# Patient Record
Sex: FEMALE | Race: BLACK OR AFRICAN AMERICAN | NOT HISPANIC OR LATINO | Employment: PART TIME | URBAN - METROPOLITAN AREA
[De-identification: names, ages, dates, MRNs, and addresses within clinical notes are randomized per-mention and may not be internally consistent; named-entity substitution may affect disease eponyms.]

---
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
## 2023-05-05 ENCOUNTER — HOSPITAL ENCOUNTER (EMERGENCY)
Facility: HOSPITAL | Age: 18
Discharge: HOME | End: 2023-05-05
Attending: EMERGENCY MEDICINE
Payer: COMMERCIAL

## 2023-05-05 VITALS
TEMPERATURE: 99 F | WEIGHT: 129 LBS | HEIGHT: 65 IN | RESPIRATION RATE: 16 BRPM | HEART RATE: 85 BPM | BODY MASS INDEX: 21.49 KG/M2 | SYSTOLIC BLOOD PRESSURE: 114 MMHG | OXYGEN SATURATION: 97 % | DIASTOLIC BLOOD PRESSURE: 79 MMHG

## 2023-05-05 DIAGNOSIS — S61.012A LACERATION OF LEFT THUMB WITHOUT FOREIGN BODY WITHOUT DAMAGE TO NAIL, INITIAL ENCOUNTER: Primary | ICD-10-CM

## 2023-05-05 DIAGNOSIS — M79.645 THUMB PAIN, LEFT: ICD-10-CM

## 2023-05-05 LAB
B-HCG UR QL: NEGATIVE
CTP QC/QA: YES

## 2023-05-05 PROCEDURE — 81025 URINE PREGNANCY TEST: CPT | Mod: ER | Performed by: EMERGENCY MEDICINE

## 2023-05-05 PROCEDURE — 12001 RPR S/N/AX/GEN/TRNK 2.5CM/<: CPT | Mod: ER

## 2023-05-05 PROCEDURE — 99284 EMERGENCY DEPT VISIT MOD MDM: CPT | Mod: 25,ER

## 2023-05-05 PROCEDURE — 25000003 PHARM REV CODE 250: Mod: ER | Performed by: EMERGENCY MEDICINE

## 2023-05-05 RX ORDER — NAPROXEN 500 MG/1
500 TABLET ORAL 2 TIMES DAILY WITH MEALS
Qty: 20 TABLET | Refills: 0 | Status: SHIPPED | OUTPATIENT
Start: 2023-05-05 | End: 2023-05-15

## 2023-05-05 RX ORDER — SULFAMETHOXAZOLE AND TRIMETHOPRIM 800; 160 MG/1; MG/1
1 TABLET ORAL 2 TIMES DAILY
Qty: 10 TABLET | Refills: 0 | Status: SHIPPED | OUTPATIENT
Start: 2023-05-05 | End: 2023-05-10

## 2023-05-05 RX ORDER — MUPIROCIN 20 MG/G
OINTMENT TOPICAL 2 TIMES DAILY
Qty: 22 G | Refills: 0 | Status: SHIPPED | OUTPATIENT
Start: 2023-05-05 | End: 2023-05-15

## 2023-05-05 RX ORDER — LIDOCAINE HYDROCHLORIDE 10 MG/ML
10 INJECTION INFILTRATION; PERINEURAL
Status: COMPLETED | OUTPATIENT
Start: 2023-05-05 | End: 2023-05-05

## 2023-05-05 RX ADMIN — LIDOCAINE HYDROCHLORIDE 10 ML: 10 INJECTION, SOLUTION INFILTRATION; PERINEURAL at 02:05

## 2023-05-05 RX ADMIN — BACITRACIN ZINC, NEOMYCIN, POLYMYXIN B 1 EACH: 400; 3.5; 5 OINTMENT TOPICAL at 02:05

## 2023-05-05 NOTE — Clinical Note
"Zbigniew Pérez" Caio was seen and treated in our emergency department on 5/5/2023.  She may return to work on 05/06/2023.       If you have any questions or concerns, please don't hesitate to call.       RN    "

## 2023-05-05 NOTE — ED PROVIDER NOTES
Encounter Date: 5/5/2023       History     Chief Complaint   Patient presents with    Laceration     Cut LT Thumb with  around Midnight. Tetanus shot is up to date according to pt      18 y.o. female with asthma presents to ED c/o acute left thumb injury that occurred about two hours prior to arrival when she accidentally cut herself with a . Right hand dominant. Denies weakness or numbness  Last tetanus 2015    The history is provided by the patient.   Review of patient's allergies indicates:  No Known Allergies  Past Medical History:   Diagnosis Date    Asthma      History reviewed. No pertinent surgical history.  Family History   Problem Relation Age of Onset    Diabetes Maternal Grandmother     Diabetes Mother     Diabetes Sister     Cancer Neg Hx      Social History     Tobacco Use    Smoking status: Never    Smokeless tobacco: Never   Substance Use Topics    Alcohol use: Never    Drug use: Never     Review of Systems   Constitutional:  Negative for fever.   Musculoskeletal:  Negative for arthralgias and myalgias.   Skin:  Positive for wound.   Neurological:  Negative for weakness and numbness.   All other systems reviewed and are negative.    Physical Exam     Initial Vitals [05/05/23 0217]   BP Pulse Resp Temp SpO2   114/79 85 16 98.5 °F (36.9 °C) 97 %      MAP       --         Physical Exam    Nursing note and vitals reviewed.  Constitutional: She appears well-developed and well-nourished. She is not diaphoretic. No distress.   HENT:   Head: Normocephalic and atraumatic.   Eyes: Conjunctivae are normal.   Neck: Neck supple.   Normal range of motion.  Cardiovascular:  Normal rate and intact distal pulses.           Pulmonary/Chest: No accessory muscle usage. No tachypnea. No respiratory distress.   Abdominal: She exhibits no distension. There is no abdominal tenderness.   Musculoskeletal:         General: No tenderness. Normal range of motion.      Left hand: Laceration present. No  swelling, deformity, tenderness or bony tenderness. Normal range of motion. Normal strength. Normal sensation. Normal capillary refill. Normal pulse.        Hands:       Cervical back: Normal range of motion and neck supple.     Neurological: She is alert and oriented to person, place, and time. She has normal strength. Gait normal. GCS eye subscore is 4. GCS verbal subscore is 5. GCS motor subscore is 6.   Skin: Skin is warm. Capillary refill takes less than 2 seconds.   Psychiatric: She has a normal mood and affect.       ED Course   Lac Repair    Date/Time: 5/5/2023 2:28 AM  Performed by: Dotty Carter MD  Authorized by: Dotty Carter MD     Consent:     Consent obtained:  Verbal    Consent given by:  Patient    Risks, benefits, and alternatives were discussed: yes      Risks discussed:  Infection and pain    Alternatives discussed:  No treatment  Universal protocol:     Procedure explained and questions answered to patient or proxy's satisfaction: yes      Patient identity confirmed:  Verbally with patient and provided demographic data  Anesthesia:     Anesthesia method:  Local infiltration    Local anesthetic:  Lidocaine 1% w/o epi  Laceration details:     Location:  Finger    Finger location:  L thumb    Length (cm):  1    Depth (mm):  2  Pre-procedure details:     Preparation:  Patient was prepped and draped in usual sterile fashion  Exploration:     Limited defect created (wound extended): no      Hemostasis achieved with:  Direct pressure    Wound exploration: wound explored through full range of motion and entire depth of wound visualized      Wound extent: areolar tissue violated      Wound extent: no fascia violation noted, no foreign bodies/material noted, no muscle damage noted, no tendon damage noted, no underlying fracture noted and no vascular damage noted      Contaminated: no    Treatment:     Area cleansed with:  Chlorhexidine, saline and povidone-iodine    Amount of cleaning:  Standard     Irrigation solution:  Sterile saline    Debridement:  None    Undermining:  None    Scar revision: no    Skin repair:     Repair method:  Sutures    Suture size:  4-0    Suture material:  Nylon    Suture technique:  Simple interrupted and horizontal mattress    Number of sutures:  3 (two simple interrupted and one horizontal mattress)  Approximation:     Approximation:  Close  Repair type:     Repair type:  Simple  Post-procedure details:     Dressing:  Antibiotic ointment, splint for protection and non-adherent dressing    Procedure completion:  Tolerated well, no immediate complications  Comments:      Neurovascular status intact before and after procedure done.  Labs Reviewed   POCT URINE PREGNANCY          Imaging Results    None          Medications   LIDOcaine HCL 10 mg/ml (1%) injection 10 mL (10 mLs Intradermal Given 5/5/23 0251)   neomycin-bacitracnZn-polymyxnB packet 1 each (1 each Topical (Top) Given 5/5/23 0251)     Medical Decision Making:   Initial Assessment:   18 y.o. female with asthma presents to ED c/o acute left thumb injury that occurred about two hours prior to arrival when she accidentally cut herself with a .  No clinical signs or symptoms of tendon injury, vascular injury, bony injury, or nerve injury.  Differential Diagnosis:   Laceration, abrasion, avulsion, retained FB, others                      Labs Reviewed  Admission on 05/05/2023   Component Date Value Ref Range Status    POC Preg Test, Ur 05/05/2023 Negative  Negative Final     Acceptable 05/05/2023 Yes   Final        Imaging Reviewed    Imaging Results    None         Medications given in ED    Medications   LIDOcaine HCL 10 mg/ml (1%) injection 10 mL (10 mLs Intradermal Given 5/5/23 0251)   neomycin-bacitracnZn-polymyxnB packet 1 each (1 each Topical (Top) Given 5/5/23 0251)       Note was created using voice recognition software. Note may have occasional typographical errors that may not have been  identified and edited despite good jaja initial review prior to signing.    Clinical Impression:   Final diagnoses:  [M79.645] Thumb pain, left  [S61.012A] Laceration of left thumb without foreign body without damage to nail, initial encounter (Primary)        ED Disposition Condition    Discharge Stable          ED Prescriptions       Medication Sig Dispense Start Date End Date Auth. Provider    mupirocin (BACTROBAN) 2 % ointment Apply topically 2 (two) times daily. for 10 days 22 g 5/5/2023 5/15/2023 Dotty Carter MD    naproxen (NAPROSYN) 500 MG tablet Take 1 tablet (500 mg total) by mouth 2 (two) times daily with meals. for 10 days 20 tablet 5/5/2023 5/15/2023 Dotty Carter MD    sulfamethoxazole-trimethoprim 800-160mg (BACTRIM DS) 800-160 mg Tab Take 1 tablet by mouth 2 (two) times daily. for 5 days 10 tablet 5/5/2023 5/10/2023 Dotty Carter MD          Follow-up Information       Follow up With Specialties Details Why Contact Info    The nearest emergency department.  Go to  As needed, If symptoms worsen     Landen Mcallister MD Pediatrics Call  As needed, for ongoing care [address] [facility]  [place] [address] [phone]      [facility] ED Emergency Medicine Go in 10 days For suture removal [address] [facility] [address] [phone]             Dotty Carter MD  05/05/23 0256

## 2023-05-05 NOTE — Clinical Note
"Zbigniew Pérez" Caio was seen and treated in our emergency department on 5/5/2023.  She may return with limitations on 05/06/2023.  Please allow for light duty until suture removed in 10 days. (5/15/23). Thank You     Sincerely,      George VALLEJO RN    "

## 2023-05-05 NOTE — Clinical Note
"Zbigniew Pérez" Caio was seen and treated in our emergency department on 5/5/2023.  She may return to work on 05/05/2023.       If you have any questions or concerns, please don't hesitate to call.       RN    "

## 2023-05-05 NOTE — DISCHARGE INSTRUCTIONS
Keep wound clean and dry.  Wash with soap and water only.  Change bandage daily.  Sutures should be removed within 7-10 days.

## 2023-05-15 ENCOUNTER — HOSPITAL ENCOUNTER (EMERGENCY)
Facility: HOSPITAL | Age: 18
Discharge: HOME | End: 2023-05-15
Attending: EMERGENCY MEDICINE
Payer: MEDICAID

## 2023-05-15 VITALS
WEIGHT: 128 LBS | HEIGHT: 64 IN | RESPIRATION RATE: 18 BRPM | DIASTOLIC BLOOD PRESSURE: 64 MMHG | OXYGEN SATURATION: 99 % | TEMPERATURE: 98 F | BODY MASS INDEX: 21.85 KG/M2 | HEART RATE: 78 BPM | SYSTOLIC BLOOD PRESSURE: 112 MMHG

## 2023-05-15 DIAGNOSIS — Z48.02 ENCOUNTER FOR REMOVAL OF SUTURES: Primary | ICD-10-CM

## 2023-05-15 DIAGNOSIS — Z51.89 ENCOUNTER FOR POST-TRAUMATIC WOUND CHECK: ICD-10-CM

## 2023-05-15 PROCEDURE — 99282 EMERGENCY DEPT VISIT SF MDM: CPT | Mod: ER

## 2023-05-15 NOTE — ED PROVIDER NOTES
Encounter Date: 5/15/2023       History     Chief Complaint   Patient presents with    Suture / Staple Removal     Left thumb suture removal.  3 sutures present, wound appears healthy.  No redness, no swelling noted.       18-year-old female presenting for suture removal.  Sutures placed in this facility on 5/5/2023 after accidentally injuring the area while using a .  She denies any drainage or concerns for wound infection.  She has been caring for the wound as instructed.  No other complaints or concerns.    The history is provided by the patient.   Review of patient's allergies indicates:  No Known Allergies  Past Medical History:   Diagnosis Date    Asthma      No past surgical history on file.  Family History   Problem Relation Age of Onset    Diabetes Maternal Grandmother     Diabetes Mother     Diabetes Sister     Cancer Neg Hx      Social History     Tobacco Use    Smoking status: Never    Smokeless tobacco: Never   Substance Use Topics    Alcohol use: Never    Drug use: Never     Review of Systems   Constitutional:  Negative for chills, fatigue and fever.   HENT:  Negative for congestion, ear pain, nosebleeds, postnasal drip, rhinorrhea, sinus pressure and sore throat.    Eyes:  Negative for photophobia and pain.   Respiratory:  Negative for apnea, cough, choking, chest tightness, shortness of breath, wheezing and stridor.    Cardiovascular:  Negative for chest pain, palpitations and leg swelling.   Gastrointestinal:  Negative for abdominal pain, constipation, diarrhea, nausea and vomiting.   Genitourinary:  Negative for dysuria, flank pain, hematuria, pelvic pain and vaginal discharge.   Musculoskeletal:  Negative for arthralgias, back pain, gait problem and neck pain.   Skin:  Positive for wound. Negative for color change, pallor and rash.   Neurological:  Negative for dizziness, seizures, syncope, facial asymmetry, light-headedness and headaches.   Hematological:  Negative for adenopathy.    Psychiatric/Behavioral:  Negative for confusion. The patient is not nervous/anxious.      Physical Exam     Initial Vitals [05/15/23 1526]   BP Pulse Resp Temp SpO2   106/67 89 18 98 °F (36.7 °C) 99 %      MAP       --         Physical Exam    Nursing note and vitals reviewed.  Constitutional: She appears well-developed and well-nourished. She is not diaphoretic. No distress.   HENT:   Head: Normocephalic and atraumatic.   Right Ear: External ear normal.   Left Ear: External ear normal.   Nose: Nose normal.   Eyes: Conjunctivae and EOM are normal. Right eye exhibits no discharge. Left eye exhibits no discharge.   Neck: Neck supple. No tracheal deviation present.   Normal range of motion.  Cardiovascular:  Normal rate.           Pulmonary/Chest: No stridor. No respiratory distress.   Abdominal: Abdomen is soft. She exhibits no distension. There is no abdominal tenderness.   Musculoskeletal:         General: No tenderness. Normal range of motion.      Cervical back: Normal range of motion and neck supple.     Neurological: She is alert and oriented to person, place, and time. She has normal strength. No cranial nerve deficit or sensory deficit.   Skin: Skin is warm and dry. Laceration noted.   1 cm laceration to the dorsal aspect of the left proximal thumb.  There are 3 sutures in place.  There is no erythema, warmth, swelling, drainage, tenderness   Psychiatric: She has a normal mood and affect. Her behavior is normal. Judgment and thought content normal.       ED Course   Suture Removal    Date/Time: 5/15/2023 5:44 PM  Location procedure was performed: [facility] EMERGENCY DEPARTMENT  Performed by: Prem Christopher NP  Authorized by: Blanca Guerrero MD   Body area: upper extremity  Location details: left thumb  Wound Appearance: clean, well healed, normal color, nontender and no drainage  Sutures Removed: 3  Post-removal: dressing applied  Facility: sutures placed in this facility  Complications: No  Specimens:  No  Implants: No      Labs Reviewed - No data to display       Imaging Results    None          Medications - No data to display  Medical Decision Making:   History:   Old Medical Records: I decided to obtain old medical records.  ED Management:  Wound check without evidence of infection or other complication.  Wound is healing well.  Sutures removed as above.  No complication.  Advised patient to continue wound care until laceration is completely healed.  She expressed understanding.                        Clinical Impression:   Final diagnoses:  [Z48.02] Encounter for removal of sutures (Primary)  [Z51.89] Encounter for post-traumatic wound check        ED Disposition Condition    Discharge Stable          ED Prescriptions    None       Follow-up Information       Follow up With Specialties Details Why Contact Info    Cac Clive Mcallister MD Pediatrics Schedule an appointment as soon as possible for a visit in 1 week For further evaluation [address] [facility]  [place] [id number] [phone]      [facility] ED Emergency Medicine Go to  If symptoms worsen, As needed [address] [facility] [address] [phone]             Prem Christopher, MIGUEL  05/15/23 4471

## 2023-05-15 NOTE — DISCHARGE INSTRUCTIONS

## 2025-04-05 ENCOUNTER — HOSPITAL ENCOUNTER (INPATIENT)
Facility: OTHER | Age: 20
LOS: 6 days | Discharge: HOME | End: 2025-04-11
Attending: OBSTETRICS & GYNECOLOGY | Admitting: OBSTETRICS & GYNECOLOGY
Payer: MEDICAID

## 2025-04-05 DIAGNOSIS — Z3A.25 25 WEEKS GESTATION OF PREGNANCY: Primary | ICD-10-CM

## 2025-04-05 DIAGNOSIS — O60.00 PRETERM LABOR: ICD-10-CM

## 2025-04-05 DIAGNOSIS — O30.042 DICHORIONIC DIAMNIOTIC TWIN PREGNANCY IN SECOND TRIMESTER: ICD-10-CM

## 2025-04-05 PROBLEM — O60.02 PRETERM LABOR IN SECOND TRIMESTER: Status: ACTIVE | Noted: 2025-04-05

## 2025-04-05 LAB
ABSOLUTE EOSINOPHIL (OHS): 0.09 K/UL
ABSOLUTE MONOCYTE (OHS): 1.57 K/UL (ref 0.3–1)
ABSOLUTE NEUTROPHIL COUNT (OHS): 11.52 K/UL (ref 1.8–7.7)
BASOPHILS # BLD AUTO: 0.02 K/UL
BASOPHILS NFR BLD AUTO: 0.1 %
ERYTHROCYTE [DISTWIDTH] IN BLOOD BY AUTOMATED COUNT: 12.9 % (ref 11.5–14.5)
HCT VFR BLD AUTO: 27.4 % (ref 37–48.5)
HGB BLD-MCNC: 9.1 GM/DL (ref 12–16)
IMM GRANULOCYTES # BLD AUTO: 0.06 K/UL (ref 0–0.04)
IMM GRANULOCYTES NFR BLD AUTO: 0.4 % (ref 0–0.5)
LYMPHOCYTES # BLD AUTO: 1.72 K/UL (ref 1–4.8)
MCH RBC QN AUTO: 28.7 PG (ref 27–31)
MCHC RBC AUTO-ENTMCNC: 33.2 G/DL (ref 32–36)
MCV RBC AUTO: 86 FL (ref 82–98)
NUCLEATED RBC (/100WBC) (OHS): 0 /100 WBC
PLATELET # BLD AUTO: 299 K/UL (ref 150–450)
PMV BLD AUTO: 9.3 FL (ref 9.2–12.9)
RBC # BLD AUTO: 3.17 M/UL (ref 4–5.4)
RELATIVE EOSINOPHIL (OHS): 0.6 %
RELATIVE LYMPHOCYTE (OHS): 11.5 % (ref 18–48)
RELATIVE MONOCYTE (OHS): 10.5 % (ref 4–15)
RELATIVE NEUTROPHIL (OHS): 76.9 % (ref 38–73)
WBC # BLD AUTO: 14.98 K/UL (ref 3.9–12.7)

## 2025-04-05 PROCEDURE — 99285 EMERGENCY DEPT VISIT HI MDM: CPT | Mod: 25

## 2025-04-05 PROCEDURE — 82040 ASSAY OF SERUM ALBUMIN: CPT

## 2025-04-05 PROCEDURE — 85025 COMPLETE CBC W/AUTO DIFF WBC: CPT

## 2025-04-05 PROCEDURE — 87340 HEPATITIS B SURFACE AG IA: CPT

## 2025-04-05 PROCEDURE — 59025 FETAL NON-STRESS TEST: CPT | Mod: 26,,, | Performed by: OBSTETRICS & GYNECOLOGY

## 2025-04-05 PROCEDURE — 99223 1ST HOSP IP/OBS HIGH 75: CPT | Mod: 25,,, | Performed by: OBSTETRICS & GYNECOLOGY

## 2025-04-05 PROCEDURE — 86850 RBC ANTIBODY SCREEN: CPT | Performed by: OBSTETRICS & GYNECOLOGY

## 2025-04-05 PROCEDURE — 87389 HIV-1 AG W/HIV-1&-2 AB AG IA: CPT

## 2025-04-05 PROCEDURE — 4A0HXCZ MEASUREMENT OF PRODUCTS OF CONCEPTION, CARDIAC RATE, EXTERNAL APPROACH: ICD-10-PCS | Performed by: OBSTETRICS & GYNECOLOGY

## 2025-04-05 PROCEDURE — 87653 STREP B DNA AMP PROBE: CPT

## 2025-04-05 PROCEDURE — 59025 FETAL NON-STRESS TEST: CPT

## 2025-04-05 PROCEDURE — 86593 SYPHILIS TEST NON-TREP QUANT: CPT

## 2025-04-05 PROCEDURE — 11000001 HC ACUTE MED/SURG PRIVATE ROOM

## 2025-04-05 PROCEDURE — 86762 RUBELLA ANTIBODY: CPT

## 2025-04-05 PROCEDURE — 63600175 PHARM REV CODE 636 W HCPCS

## 2025-04-05 RX ORDER — BETAMETHASONE SODIUM PHOSPHATE AND BETAMETHASONE ACETATE 3; 3 MG/ML; MG/ML
12 INJECTION, SUSPENSION INTRA-ARTICULAR; INTRALESIONAL; INTRAMUSCULAR; SOFT TISSUE EVERY 24 HOURS
Status: COMPLETED | OUTPATIENT
Start: 2025-04-05 | End: 2025-04-06

## 2025-04-05 RX ORDER — INDOMETHACIN 25 MG/1
25 CAPSULE ORAL EVERY 6 HOURS
Status: COMPLETED | OUTPATIENT
Start: 2025-04-06 | End: 2025-04-07

## 2025-04-05 RX ORDER — PRENATAL WITH FERROUS FUM AND FOLIC ACID 3080; 920; 120; 400; 22; 1.84; 3; 20; 10; 1; 12; 200; 27; 25; 2 [IU]/1; [IU]/1; MG/1; [IU]/1; MG/1; MG/1; MG/1; MG/1; MG/1; MG/1; UG/1; MG/1; MG/1; MG/1; MG/1
1 TABLET ORAL DAILY
Status: DISCONTINUED | OUTPATIENT
Start: 2025-04-06 | End: 2025-04-09

## 2025-04-05 RX ORDER — ACETAMINOPHEN 325 MG/1
650 TABLET ORAL EVERY 6 HOURS PRN
Status: DISCONTINUED | OUTPATIENT
Start: 2025-04-06 | End: 2025-04-09

## 2025-04-05 RX ORDER — DIPHENHYDRAMINE HYDROCHLORIDE 50 MG/ML
25 INJECTION, SOLUTION INTRAMUSCULAR; INTRAVENOUS EVERY 4 HOURS PRN
Status: DISCONTINUED | OUTPATIENT
Start: 2025-04-06 | End: 2025-04-09

## 2025-04-05 RX ORDER — AMOXICILLIN 250 MG
1 CAPSULE ORAL NIGHTLY PRN
Status: DISCONTINUED | OUTPATIENT
Start: 2025-04-06 | End: 2025-04-09

## 2025-04-05 RX ORDER — INDOMETHACIN 25 MG/1
50 CAPSULE ORAL ONCE
Status: COMPLETED | OUTPATIENT
Start: 2025-04-05 | End: 2025-04-06

## 2025-04-05 RX ORDER — SODIUM CHLORIDE, SODIUM LACTATE, POTASSIUM CHLORIDE, CALCIUM CHLORIDE 600; 310; 30; 20 MG/100ML; MG/100ML; MG/100ML; MG/100ML
INJECTION, SOLUTION INTRAVENOUS CONTINUOUS
Status: DISCONTINUED | OUTPATIENT
Start: 2025-04-06 | End: 2025-04-07 | Stop reason: ALTCHOICE

## 2025-04-05 RX ORDER — SODIUM CHLORIDE 0.9 % (FLUSH) 0.9 %
10 SYRINGE (ML) INJECTION
Status: DISCONTINUED | OUTPATIENT
Start: 2025-04-06 | End: 2025-04-09

## 2025-04-05 RX ORDER — SIMETHICONE 80 MG
1 TABLET,CHEWABLE ORAL EVERY 6 HOURS PRN
Status: DISCONTINUED | OUTPATIENT
Start: 2025-04-06 | End: 2025-04-09

## 2025-04-05 RX ORDER — MAGNESIUM SULFATE HEPTAHYDRATE 40 MG/ML
2 INJECTION, SOLUTION INTRAVENOUS CONTINUOUS
Status: DISCONTINUED | OUTPATIENT
Start: 2025-04-06 | End: 2025-04-07 | Stop reason: ALTCHOICE

## 2025-04-05 RX ORDER — MAGNESIUM SULFATE HEPTAHYDRATE 40 MG/ML
6 INJECTION, SOLUTION INTRAVENOUS ONCE
Status: COMPLETED | OUTPATIENT
Start: 2025-04-06 | End: 2025-04-06

## 2025-04-05 RX ORDER — DIPHENHYDRAMINE HCL 25 MG
25 CAPSULE ORAL EVERY 4 HOURS PRN
Status: DISCONTINUED | OUTPATIENT
Start: 2025-04-06 | End: 2025-04-09

## 2025-04-05 RX ORDER — ONDANSETRON 8 MG/1
8 TABLET, ORALLY DISINTEGRATING ORAL EVERY 8 HOURS PRN
Status: DISCONTINUED | OUTPATIENT
Start: 2025-04-06 | End: 2025-04-09

## 2025-04-05 RX ORDER — CALCIUM GLUCONATE 98 MG/ML
1 INJECTION, SOLUTION INTRAVENOUS
Status: DISCONTINUED | OUTPATIENT
Start: 2025-04-05 | End: 2025-04-09

## 2025-04-05 RX ADMIN — BETAMETHASONE SODIUM PHOSPHATE AND BETAMETHASONE ACETATE 12 MG: 3; 3 INJECTION, SUSPENSION INTRA-ARTICULAR; INTRALESIONAL; INTRAMUSCULAR at 11:04

## 2025-04-06 ENCOUNTER — ANESTHESIA (OUTPATIENT)
Dept: OBSTETRICS AND GYNECOLOGY | Facility: OTHER | Age: 20
End: 2025-04-06
Payer: MEDICAID

## 2025-04-06 ENCOUNTER — ANESTHESIA EVENT (OUTPATIENT)
Dept: OBSTETRICS AND GYNECOLOGY | Facility: OTHER | Age: 20
End: 2025-04-06
Payer: MEDICAID

## 2025-04-06 PROBLEM — O99.820 GBS (GROUP B STREPTOCOCCUS CARRIER), +RV CULTURE, CURRENTLY PREGNANT: Status: RESOLVED | Noted: 2019-08-05 | Resolved: 2025-04-06

## 2025-04-06 PROBLEM — R52 PAIN DURING LABOR: Status: RESOLVED | Noted: 2019-08-23 | Resolved: 2025-04-06

## 2025-04-06 LAB
ALBUMIN SERPL BCP-MCNC: 2.8 G/DL (ref 3.5–5.2)
ALP SERPL-CCNC: 55 UNIT/L (ref 40–150)
ALT SERPL W/O P-5'-P-CCNC: 9 UNIT/L (ref 10–44)
ANION GAP (OHS): 8 MMOL/L (ref 8–16)
AST SERPL-CCNC: 12 UNIT/L (ref 11–45)
BILIRUB SERPL-MCNC: 0.5 MG/DL (ref 0.1–1)
BUN SERPL-MCNC: 5 MG/DL (ref 6–20)
CALCIUM SERPL-MCNC: 8.8 MG/DL (ref 8.7–10.5)
CHLORIDE SERPL-SCNC: 108 MMOL/L (ref 95–110)
CO2 SERPL-SCNC: 19 MMOL/L (ref 23–29)
CREAT SERPL-MCNC: 0.6 MG/DL (ref 0.5–1.4)
GFR SERPLBLD CREATININE-BSD FMLA CKD-EPI: >60 ML/MIN/1.73/M2
GLUCOSE SERPL-MCNC: 93 MG/DL (ref 70–110)
HBV SURFACE AG SERPL QL IA: NORMAL
HIV 1+2 AB+HIV1 P24 AG SERPL QL IA: NEGATIVE
INDIRECT COOMBS: NORMAL
POTASSIUM SERPL-SCNC: 3.3 MMOL/L (ref 3.5–5.1)
PROT SERPL-MCNC: 7.4 GM/DL (ref 6–8.4)
RH BLD: NORMAL
SODIUM SERPL-SCNC: 135 MMOL/L (ref 136–145)
SPECIMEN OUTDATE: NORMAL
T PALLIDUM IGG+IGM SER QL: NEGATIVE

## 2025-04-06 PROCEDURE — 11000001 HC ACUTE MED/SURG PRIVATE ROOM

## 2025-04-06 PROCEDURE — 59025 FETAL NON-STRESS TEST: CPT | Mod: 26,59,, | Performed by: OBSTETRICS & GYNECOLOGY

## 2025-04-06 PROCEDURE — 99285 EMERGENCY DEPT VISIT HI MDM: CPT | Mod: 25,,, | Performed by: OBSTETRICS & GYNECOLOGY

## 2025-04-06 PROCEDURE — 86920 COMPATIBILITY TEST SPIN: CPT

## 2025-04-06 PROCEDURE — 25000003 PHARM REV CODE 250

## 2025-04-06 PROCEDURE — 63600175 PHARM REV CODE 636 W HCPCS

## 2025-04-06 PROCEDURE — 76815 OB US LIMITED FETUS(S): CPT | Mod: 26,,, | Performed by: OBSTETRICS & GYNECOLOGY

## 2025-04-06 RX ORDER — SODIUM CHLORIDE, SODIUM LACTATE, POTASSIUM CHLORIDE, CALCIUM CHLORIDE 600; 310; 30; 20 MG/100ML; MG/100ML; MG/100ML; MG/100ML
INJECTION, SOLUTION INTRAVENOUS CONTINUOUS
Status: DISCONTINUED | OUTPATIENT
Start: 2025-04-06 | End: 2025-04-06

## 2025-04-06 RX ORDER — LANOLIN ALCOHOL/MO/W.PET/CERES
1 CREAM (GRAM) TOPICAL DAILY
Status: DISCONTINUED | OUTPATIENT
Start: 2025-04-06 | End: 2025-04-09

## 2025-04-06 RX ORDER — ONDANSETRON HYDROCHLORIDE 2 MG/ML
4 INJECTION, SOLUTION INTRAVENOUS ONCE
Status: COMPLETED | OUTPATIENT
Start: 2025-04-06 | End: 2025-04-06

## 2025-04-06 RX ORDER — HYDROCODONE BITARTRATE AND ACETAMINOPHEN 500; 5 MG/1; MG/1
TABLET ORAL
Status: DISCONTINUED | OUTPATIENT
Start: 2025-04-06 | End: 2025-04-06

## 2025-04-06 RX ADMIN — MAGNESIUM SULFATE HEPTAHYDRATE 2 G/HR: 40 INJECTION, SOLUTION INTRAVENOUS at 12:04

## 2025-04-06 RX ADMIN — ONDANSETRON 4 MG: 2 INJECTION INTRAMUSCULAR; INTRAVENOUS at 12:04

## 2025-04-06 RX ADMIN — PENICILLIN G POTASSIUM 3 MILLION UNITS: 20000000 INJECTION, POWDER, FOR SOLUTION INTRAMUSCULAR; INTRAVENOUS at 04:04

## 2025-04-06 RX ADMIN — INDOMETHACIN 25 MG: 25 CAPSULE ORAL at 05:04

## 2025-04-06 RX ADMIN — SODIUM CHLORIDE, POTASSIUM CHLORIDE, SODIUM LACTATE AND CALCIUM CHLORIDE 125 ML/HR: 600; 310; 30; 20 INJECTION, SOLUTION INTRAVENOUS at 12:04

## 2025-04-06 RX ADMIN — INDOMETHACIN 25 MG: 25 CAPSULE ORAL at 11:04

## 2025-04-06 RX ADMIN — PENICILLIN G POTASSIUM 3 MILLION UNITS: 20000000 INJECTION, POWDER, FOR SOLUTION INTRAMUSCULAR; INTRAVENOUS at 12:04

## 2025-04-06 RX ADMIN — INDOMETHACIN 50 MG: 25 CAPSULE ORAL at 12:04

## 2025-04-06 RX ADMIN — PENICILLIN G POTASSIUM 3 MILLION UNITS: 20000000 INJECTION, POWDER, FOR SOLUTION INTRAMUSCULAR; INTRAVENOUS at 08:04

## 2025-04-06 RX ADMIN — MAGNESIUM SULFATE HEPTAHYDRATE 2 G/HR: 40 INJECTION, SOLUTION INTRAVENOUS at 05:04

## 2025-04-06 RX ADMIN — BETAMETHASONE SODIUM PHOSPHATE AND BETAMETHASONE ACETATE 12 MG: 3; 3 INJECTION, SUSPENSION INTRA-ARTICULAR; INTRALESIONAL; INTRAMUSCULAR at 11:04

## 2025-04-06 RX ADMIN — PRENATAL VIT W/ FE FUMARATE-FA TAB 27-0.8 MG 1 TABLET: 27-0.8 TAB at 08:04

## 2025-04-06 RX ADMIN — DEXTROSE MONOHYDRATE 5 MILLION UNITS: 5 INJECTION INTRAVENOUS at 12:04

## 2025-04-06 RX ADMIN — SODIUM CHLORIDE, POTASSIUM CHLORIDE, SODIUM LACTATE AND CALCIUM CHLORIDE: 600; 310; 30; 20 INJECTION, SOLUTION INTRAVENOUS at 01:04

## 2025-04-06 RX ADMIN — MAGNESIUM SULFATE HEPTAHYDRATE 6 G: 40 INJECTION, SOLUTION INTRAVENOUS at 12:04

## 2025-04-06 RX ADMIN — FERROUS SULFATE TAB 325 MG (65 MG ELEMENTAL FE) 1 EACH: 325 (65 FE) TAB at 08:04

## 2025-04-06 NOTE — CARE UPDATE
Resident to bedside for complaints of vaginal bleeding. On chux pad, golf ball sized bleeding seen on chux. No active bleeding noted on vaginal exam. Repeat SVE 4/90/BBOW. No bleeding noted on glove. Patient reports contractions have spaced out. FHT reactive and reassuring x2, irregular contractions on tocometry. Continue to monitor. Instructed patient to call out if any additional bleeding or intensified contraction pain.     aMdy King MD PGY-3  Obstetrics and Gynecology  [facility]

## 2025-04-06 NOTE — PROGRESS NOTES
[facility] - Labor & Delivery  Maternal & Fetal Medicine  Progress Note    Patient Name: Zbigniew Kearney  MRN: [id number]  Code Status: Full Code   Admission Date: 4/5/2025  Length of Stay: 1 days  Attending Physician: Erich Cardoso III, *  Primary Care Provider: Landen Mcallister MD    Subjective:     Interval History:   Patient reports that contractions have improved since initial admission. Reports they are tolerable and feel more like cramping. Active fetal movement, minimal vaginal bleeding with wiping, no clots, No loss of fluid    Review of patient's allergies indicates:  No Known Allergies    Objective:     Vital Signs (Most Recent):  Temp: 98.5 °F (36.9 °C) (04/06/25 0023)  Pulse: 87 (04/06/25 0430)  Resp: 15 (04/06/25 0314)  BP: 115/66 (04/06/25 0429)  SpO2: 97 % (04/06/25 0429) Vital Signs (24h Range):  Temp:  [98.5 °F (36.9 °C)-98.6 °F (37 °C)] 98.5 °F (36.9 °C)  Pulse:  [] 87  Resp:  [15-18] 15  SpO2:  [95 %-100 %] 97 %  BP: ()/(55-84) 115/66       Intake/Output Summary (Last 24 hours) at 4/6/2025 0444  Last data filed at 4/6/2025 0329  Gross per 24 hour   Intake 289.29 ml   Output 700 ml   Net -410.71 ml       Physical Exam:   Constitutional: She is oriented to person, place, and time. She appears well-developed and well-nourished.     Eyes: Conjunctivae are normal.     Cardiovascular:  Normal rate.             Pulmonary/Chest: Effort normal. No respiratory distress.        Abdominal: Soft. There is no abdominal tenderness. There is no rebound and no guarding.   gravid             Musculoskeletal: Moves all extremeties.       Neurological: She is alert and oriented to person, place, and time.     Psychiatric: She has a normal mood and affect. Her behavior is normal.     SVE @ 0430: 4/80/-3/BBOW (4/6)    FHT/NST: A: 125, B 135; mod hossein, + accels, few variable decels decels   Cat 2 (reassuring)                 TOCO: Q 2-4 minutes       Significant Labs: CBC:    Recent Labs   Lab  25  2348   WBC 14.98*   HGB 9.1*   HCT 27.4*          Assessment/Plan:   25w6d weeks gestation presents for:    Active Diagnoses:    Diagnosis Date Noted POA    PRINCIPAL PROBLEM:   labor in second trimester [O60.02] 2025 Unknown    Dichorionic diamniotic twin pregnancy in second trimester [O30.042] 2025 Unknown    Asthma - childhood [J45.909] 2019 Yes      Problems Resolved During this Admission:       PTL   -Admitted to L&D with SVE 4/80/-3, @ 25w5d; stable checks x4 (-)  -Magnesium for fetal neuro-protection, BMZ, PCN, Indocin for tocolysis continued  -Continuous monitoring and NPO  -If cervix continues to dilate > deliver via C/S due to prematurity      Di Di twin pregnancy  -Follows with Dr. Fiore at [facility]  -prenatal labs UTD, 3T labs in process   -BSUS with Twin A: vertex, Twin B: transverse head mat R  --Will require C/S for delivery if labor progresses      Anemia  -H/H on admit   -1u pRBC held  -Fe/Colace PPH risk: medium      Childhood Asthma  -asymptomatic  -no home meds    Fellow attestation.Patient is a 19 y.o.  at 25w6d admitted to [facility] service for threatened  labor in the setting of dichorionic diamniotic twin gestation. [place], denies any complications this pregnancy. She reports a history of a full term uncomplicated vaginal delivery.     Yesterday she attended [facility] and reports walking a far distance. While there, she started to notice intermittently painful contractions prompting her presentation to [facility]. Cervix was found to be 4/80/-3 with regular contractions noted on admission. Initiated on betamethasone, magnesium sulfate for neuroprotection, and indocin for tocolysis. Fortunately, Ms. Kearney' cervical exam has remained stable on several exams and she reports an improvement in contractions.       Temp:  [98 °F (36.7 °C)-98.6 °F (37 °C)] 98 °F (36.7 °C)  Pulse:  [] 95  Resp:   [14-18] 14  SpO2:  [95 %-100 %] 96 %  BP: ()/(53-84) 91/53     FHT:   Twin A: Baseline 120s; moderate variability; +accels; occasional small variables; no significant decels  Twin B: Baseline 130s; moderate variability; +accels; occasional small variables; no significant decels  Irregular contractions, some uterine irritability  Overall reactive and reassuring for gestational age     Reviewed the diagnosis and management of threatened  labor at 25 weeks with Di/Di twins. Will plan to continue Magnesium until second dose of betamethasone this evening. Will continue indocin until 48 hours from initial steroid dose (48 hours total). We reviewed that we recommend delivery via CD due to early gestational age and inability to perform a breech extraction of second twin. Will obtain NICU consultation due to anticipated prematurity. All questions answered.      Lizzeth Escalera MD  PGY 7  Maternal Fetal Medicine  [facility]    Michaelle Brown MD  Maternal & Fetal Medicine  [facility] - Labor & Delivery

## 2025-04-06 NOTE — ED PROVIDER NOTES
Encounter Date: 2025       History     Chief Complaint   Patient presents with    Contractions     HPI    Patient is a 18yo  at estimated 25w5d gestation with di/di twins presenting to the [facility] with contractions. Reports feeling intermittent contractions around 2000 this evening, with increase in frequency around 2200. Contractions occurring q 2 minutes. Reports that around 2200, she also had gone to the restroom and noticed blood and mucus, that she thought could be a mucus plug.    Endorsing fetal movement. Denies LOF, CP, SOB, headache.    Uncomplicated IUP thus far.     Review of patient's allergies indicates:  No Known Allergies  Past Medical History:   Diagnosis Date    Asthma      No past surgical history on file.  Family History   Problem Relation Name Age of Onset    Diabetes Maternal Grandmother      Diabetes Mother      Diabetes Sister      Cancer Neg Hx       Social History[1]    Physical Exam     Initial Vitals [25 2304]   BP Pulse Resp Temp SpO2   112/77 103 16 98.6 °F (37 °C) 96 %      MAP       --         Physical Exam    Constitutional: She appears well-developed and well-nourished. She is not diaphoretic. No distress.   HENT:   Head: Normocephalic and atraumatic.   Cardiovascular:  Normal rate, regular rhythm and normal heart sounds.           Pulmonary/Chest: Breath sounds normal. No respiratory distress. She has no wheezes.   Abdominal:   gravid   Musculoskeletal:         General: No edema. Normal range of motion.     Neurological: She is alert.   Skin: Skin is warm and dry.   Psychiatric: She has a normal mood and affect. Thought content normal.     OB LABOR EXAM:   Pre-Term Labor: Yes.   Membranes ruptured: No.   Method: Sterile vaginal exam per MD and Sterile speculum exam per MD.   Vaginal Bleeding: bloody show.     Dilatation: 4.   Station: -3.   Effacement: 80%.       Comments: Bulging bag on spec exam       ED Course   Fetal non-stress test    Date/Time: 2025 11:56  PM    Performed by: Flaquita Lee DO  Authorized by: Jennifer Munoz MD    Nonstress Test:     Variability: A: 6-25 BPM; B 6-25 BPM.    Decelerations:  None    Baby A baseline heart rate (BPM): A: 145; B: 155.    Contractions:  Regular    Contraction Frequency:  Q2 min  Post-procedure:     Patient tolerance:  Patient tolerated the procedure well with no immediate complications    Labs Reviewed   CBC WITH DIFFERENTIAL - Abnormal       Result Value    WBC 14.98 (*)     RBC 3.17 (*)     HGB 9.1 (*)     HCT 27.4 (*)     MCV 86      MCH 28.7      MCHC 33.2      RDW 12.9      Platelet Count 299      MPV 9.3      Nucleated RBC 0      Neut % 76.9 (*)     Lymph % 11.5 (*)     Mono % 10.5      Eos % 0.6      Basophil % 0.1      Imm Grans % 0.4      Neut # 11.52 (*)     Lymph # 1.72      Mono # 1.57 (*)     Eos # 0.09      Baso # 0.02      Imm Grans # 0.06 (*)    GROUP B STREPTOCOCCUS, PCR   CBC W/ AUTO DIFFERENTIAL    Narrative:     The following orders were created for panel order CBC auto differential.  Procedure                               Abnormality         Status                     ---------                               -----------         ------                     CBC with Differential[[id number]]       Abnormal            Final result                 Please view results for these tests on the individual orders.   COMPREHENSIVE METABOLIC PANEL   TREPONEMA PALLIDIUM ANTIBODIES IGG, IGM   HEPATITIS B SURFACE ANTIGEN   RUBELLA ANTIBODY, IGG   HIV 1 / 2 ANTIBODY   TYPE & SCREEN   PREPARE RBC SOFT          Imaging Results    None          Medications   sodium chloride 0.9% flush 10 mL (has no administration in time range)   acetaminophen tablet 650 mg (has no administration in time range)   ondansetron disintegrating tablet 8 mg (has no administration in time range)   senna-docusate 8.6-50 mg per tablet 1 tablet (has no administration in time range)   simethicone chewable tablet 80 mg (has no administration  in time range)   diphenhydrAMINE capsule 25 mg (has no administration in time range)   diphenhydrAMINE injection 25 mg (has no administration in time range)   prenatal vitamin oral tablet (has no administration in time range)   betamethasone acetate-betamethasone sodium phosphate injection 12 mg (12 mg Intramuscular Given 25 2356)   magnesium sulfate in water 40 gram/1,000 mL (4 %) bolus from bag 6 g 150 mL (has no administration in time range)     And   lactated ringers infusion (has no administration in time range)   magnesium sulfate in water 40 gram/1,000 mL (4 %) infusion (has no administration in time range)   calcium gluconate 100 mg/mL (10%) injection 1 g (has no administration in time range)   penicillin G potassium 3 Million Units in dextrose 5 % 100 mL IVPB (has no administration in time range)   indomethacin capsule 25 mg (has no administration in time range)   penicillin G potassium 5 Million Units in D5W 100 mL IVPB (MB+) (has no administration in time range)   0.9%  NaCl infusion (for blood administration) (has no administration in time range)   indomethacin capsule 50 mg (50 mg Oral Given 25 0000)     Medical Decision Making    Patient is a 20yo  at estimated 25w5d gestation with di/di twins presenting to the [facility] with contractions.    FNST showing regular contractions. Speculum exam with bloody show and bulging back. She is 4cm dilated.    POCUS revealing cephalad Baby A and transverse Baby B.     Admitted to L&D for further management. Please see H&P for additional information.              ED Course as of 25 0003   Sat 2025   9061 Zbigniew Kearney is a 19 y.o. female  @ 25w5d presenting for contractions, loss of mucus plug. Pt reports 6/10 pain with contractions, however laughing and conversing with ease. Reports minor VB- believes she lost mucus plug.   VSS  GEN: NAD, cheerful   CV: RRR  Resp: CTAB  Abd: soft, gravid.   Sve per Dr. Brown /-3 @ 7387  USG:  cephalic, baby B transverse, head to maternal right     Plan to admit to L&D for  labor, counseled regarding BMZ, Mag, PCN. Pt agreeable. Discussed likely  delivery if labor progresses and baby B remains with malpresentation or if due to early prematurity either fetus does not tolerate labor.  [RF]      ED Course User Index  [RF] Jennifer Munoz MD                           Clinical Impression:  Final diagnoses:  [O60.00]  labor  [Z3A.25] 25 weeks gestation of pregnancy (Primary)  [O30.042] Dichorionic diamniotic twin pregnancy in second trimester          ED Disposition Condition    Send to L&D                   Flaquita Lee DO  Resident  25 0003         [1]   Social History  Tobacco Use    Smoking status: Never    Smokeless tobacco: Never   Substance Use Topics    Alcohol use: Never    Drug use: Never        Jennifer Munoz MD  25 3045

## 2025-04-06 NOTE — PLAN OF CARE
Problem:  Fall Injury Risk  Goal: Absence of Fall, Infant Drop and Related Injury  Outcome: Progressing     Problem: Adult Inpatient Plan of Care  Goal: Plan of Care Review  Outcome: Progressing  Goal: Patient-Specific Goal (Individualized)  Outcome: Progressing  Goal: Absence of Hospital-Acquired Illness or Injury  Outcome: Progressing  Goal: Optimal Comfort and Wellbeing  Outcome: Progressing  Goal: Readiness for Transition of Care  Outcome: Progressing     Problem: Fatigue  Goal: Improved Activity Tolerance  Outcome: Progressing     Problem: Pain Acute  Goal: Optimal Pain Control and Function  Outcome: Progressing

## 2025-04-06 NOTE — PLAN OF CARE
Vital signs stable.   Pain controlled  Feeding: BF   Void: : spontaneously  No concerns at this time       Detail Level: Detailed Detail Level: Generalized

## 2025-04-06 NOTE — ANESTHESIA PREPROCEDURE EVALUATION
"[facility]  Anesthesia Pre-Operative Evaluation         Patient Name: Zbigniew Kearney  YOB: 2005  MRN: [id number]    2025      Zbigniew Kearney is a 19 y.o. female  @ 25w6d with dichorionic diamniotic twin gestation who presents to [facility] in labor. IUP c/b PTL, di di twin gestation, childhood asthma, anemia (H/H; ).   Patient denies cardiopulmonary disease, bleeding disorders, or spine pathology.     OB History    Para Term  AB Living   2 1 1   1   SAB IAB Ectopic Multiple Live Births      0 1      # Outcome Date GA Lbr Osmin/2nd Weight Sex Type Anes PTL Lv   2 Current            1 Term 19 38w5d  3.56 kg (7 lb 13.6 oz) M Vag-Spont EPI  VASU       Review of patient's allergies indicates:  No Known Allergies    Wt Readings from Last 1 Encounters:   05/15/23 1526 58.1 kg (128 lb)       BP Readings from Last 3 Encounters:   25 112/77   05/15/23 112/64   23 114/79       Problem List[1]    No past surgical history on file.    Social History[2]      Chemistry    No results found for: "NA", "K", "CL", "CO2", "BUN", "CREATININE", "GLU" No results found for: "CALCIUM", "ALKPHOS", "AST", "ALT", "BILITOT", "ESTGFRAFRICA", "EGFRNONAA"         Lab Results   Component Value Date    WBC 14.98 (H) 2025    HGB 9.1 (L) 2025    HCT 27.4 (L) 2025    MCV 86 2025     2025       No results for input(s): "PT", "INR", "PROTIME", "APTT" in the last 72 hours.          Pre-op Assessment    I have reviewed the Patient Summary Reports.     I have reviewed the Nursing Notes. I have reviewed the NPO Status.   I have reviewed the Medications.     Review of Systems  Anesthesia Hx:  No problems with previous Anesthesia   History of prior surgery of interest to airway management or planning:          Denies Family Hx of Anesthesia complications.    Denies Personal Hx of Anesthesia complications.                    Social:  Non-Smoker, " No Alcohol Use       Hematology/Oncology:       -- Anemia:                  Denies Current/Recent Cancer                EENT/Dental:  denies chronic allergic rhinitis           Cardiovascular:      Denies Hypertension.    Denies CAD.           no hyperlipidemia                               Pulmonary:    Denies COPD. Asthma     Denies Sleep Apnea.                Renal/:   Denies Chronic Renal Disease.                Hepatic/GI:      Denies GERD.                Musculoskeletal:  Denies Arthritis.               Neurological:    Denies CVA.    Denies Seizures.                                Psych:  Denies Psychiatric History.                  Physical Exam  General: Well nourished, Cooperative, Alert and Oriented    Airway:  Mallampati: II   Mouth Opening: Normal  TM Distance: Normal  Tongue: Normal  Neck ROM: Normal ROM    Dental:  Intact        Anesthesia Plan  Type of Anesthesia, risks & benefits discussed:    Anesthesia Type: CSE, Gen ETT, Spinal, Epidural  Intra-op Monitoring Plan: Standard ASA Monitors  Post Op Pain Control Plan: multimodal analgesia  Induction:  IV  Airway Plan: Video, Post-Induction  Informed Consent: Informed consent signed with the Patient and all parties understand the risks and agree with anesthesia plan.  All questions answered.   ASA Score: 3  Day of Surgery Review of History & Physical: H&P Update referred to the surgeon/provider.    Ready For Surgery From Anesthesia Perspective.     .           [1]   Patient Active Problem List  Diagnosis    Family history of DVT    Asthma - childhood    GBS (group B Streptococcus carrier), +RV culture, currently pregnant    Pain during labor     labor in second trimester    Dichorionic diamniotic twin pregnancy in second trimester   [2]   Social History  Socioeconomic History    Marital status: Single   Tobacco Use    Smoking status: Never    Smokeless tobacco: Never   Substance and Sexual Activity    Alcohol use: Never    Drug use: Never     Sexual activity: Yes

## 2025-04-06 NOTE — H&P
[facility] Emergency Dept (Obstetrics)  Maternal & Fetal Medicine  History & Physical    Patient Name: Zbigniew Abraham  MRN: [id number]  Admission Date: 2025  Attending Physician: Erich Cardoso III, *   Primary Care Provider: Landen Mcallister MD    Subjective:     Principal Problem: labor in second trimester    HPI:   Zbigniew Abraham is a 19 y.o. [id number] at 25w5d with a dichorionic diamniotic twin gestation who presented to the [facility] complaining of contractions that began about 5 hours ago and have increased in frequency. Reports she was walking around and noticed an increase in her cramping as well as some vaginal spotting with wiping. Denies fever, chills, N/V/D, rash, HA, syncope, abdominal trauma, dysuria, hematuria.    In the [facility] she was found to be 4/80/-3 with a bulging bag of water. Twin A vertex, Twin B transverse with head maternal right. Admitted to L&D for PTL.     This IUP is complicated by Di Di twin gestation, childhood asthma, anemia.  Patient reports contractions, reports vaginal bleeding, denies LOF.   Fetal Movement: normal.       OB History    Para Term  AB Living   2 1 1 0 0 1   SAB IAB Ectopic Multiple Live Births   0 0 0 0 1      # Outcome Date GA Lbr Osmin/2nd Weight Sex Type Anes PTL Lv   2 Current            1 Term 19 38w5d  3.56 kg (7 lb 13.6 oz) M Vag-Spont EPI  VASU      Name: PAULINO ABRAHAM      Apgar1: 9  Apgar5: 9     Past Medical History:   Diagnosis Date    Asthma        No past surgical history on file.    Review of patient's allergies indicates:  No Known Allergies    Prescriptions Prior to Admission[1]  Family History       Problem Relation (Age of Onset)    Diabetes Maternal Grandmother, Mother, Sister          Tobacco Use    Smoking status: Never    Smokeless tobacco: Never   Substance and Sexual Activity    Alcohol use: Never    Drug use: Never    Sexual activity: Yes     Review of Systems   Constitutional:  Negative for chills,  diaphoresis, fatigue and fever.   Respiratory:  Negative for cough and shortness of breath.    Cardiovascular:  Negative for chest pain.   Gastrointestinal:  Positive for abdominal pain. Negative for constipation, diarrhea, nausea and vomiting.   Genitourinary:  Positive for pelvic pain and vaginal bleeding. Negative for dysuria and hematuria.   Musculoskeletal:  Negative for back pain.   Neurological:  Negative for syncope and headaches.     Objective:     Vital Signs (24h Range):      Temp:  [98.6 °F (37 °C)] 98.6 °F (37 °C)  Pulse:  [103] 103  Resp:  [16] 16  SpO2:  [96 %] 96 %  BP: (112)/(77) 112/77       Physical Exam:   Constitutional: She is oriented to person, place, and time. She appears well-developed and well-nourished. No distress.     Eyes: Conjunctivae are normal.     Cardiovascular:  Normal rate.             Pulmonary/Chest: Effort normal. No respiratory distress.        Abdominal: There is no abdominal tenderness. There is no rebound and no guarding.   gravid             Musculoskeletal: No tenderness or edema.       Neurological: She is alert and oriented to person, place, and time.     Psychiatric: She has a normal mood and affect. Her behavior is normal.     SVE: 4/80/-3 with bulging bag of water, minimal blood tinged mucous on labia   SSE: bulging bag of water with blood tinged mucous within vagina    NST  A:140, mod hossein, + accels, variable decels  B: 150, mod hossein, + accels, variable decels  TOCO with irritability and regular ctx q 3 minutes    Overall reassuring and appropriate for gestational age     Significant Labs: CBC:    Recent Labs   Lab 25  2348   WBC 14.98*   HGB 9.1*   HCT 27.4*          Assessment/Plan:   25w5d weeks gestation presents for:    Active Diagnoses:    Diagnosis Date Noted POA    PRINCIPAL PROBLEM:   labor in second trimester [O60.02] 2025 Unknown    Dichorionic diamniotic twin pregnancy in second trimester [O30.042] 2025 Unknown    Asthma -  childhood [J45.909] 2019 Yes      Problems Resolved During this Admission:       PTL   -Admitted to L&D with SVE 4/80/-3, @ 25w5d; will recheck in 2 hours or PRN  -Magnesium for fetal neuro-protection, BMZ, PCN, Indocin for tocolysis initiated  -Continuous monitoring and NPO  -If cervix continues to dilate > deliver via C/S due to prematurity     Di Di twin pregnancy  -Follows with Dr. Fiore at [facility]  -prenatal labs UTD, 3T labs in process   -BSUS with Twin A: vertex, Twin B: transverse head mat R  --Will require C/S for delivery if labor progresses     Anemia  -H/H on admit   -1u pRBC held  -Fe/Colace PPH risk: medium     Childhood Asthma  -asymptomatic  -no home meds    Risks, benefits, alternatives and possible complications have been discussed in detail with the patient.   - Consents signed and added to chart  - Admit to L&D    Michaelle Brown MD  Maternal & Fetal Medicine  [facility] - Emergency Dept (Obstetrics)    Fellow attestation.Patient is a 19 y.o.  at 25w6d admitted to [facility] service for threatened  labor in the setting of dichorionic diamniotic twin gestation. Patient follows at [facility] for prenatal care, denies any complications this pregnancy. She reports a history of a full term uncomplicated vaginal delivery.    [facility] and reports walking a far distance. While there, she started to notice intermittently painful contractions prompting her presentation to [facility]. Cervix was found to be 4/80/-3 with regular contractions noted on admission. Initiated on betamethasone, magnesium sulfate for neuroprotection, and indocin for tocolysis. Fortunately, Ms. Kearney' cervical exam has remained stable on several exams and she reports an improvement in contractions.      Temp:  [98 °F (36.7 °C)-98.6 °F (37 °C)] 98 °F (36.7 °C)  Pulse:  [] 95  Resp:  [14-18] 14  SpO2:  [95 %-100 %] 96 %  BP: ()/(53-84) 91/53    FHT:   Twin A: Baseline 120s; moderate  variability; +accels; occasional small variables; no significant decels  Twin B: Baseline 130s; moderate variability; +accels; occasional small variables; no significant decels  Irregular contractions, some uterine irritability  Overall reactive and reassuring for gestational age    Reviewed the diagnosis and management of threatened  labor at 25 weeks with Di/Di twins. Will plan to continue Magnesium until second dose of betamethasone this evening. Will continue indocin until 48 hours from initial steroid dose (48 hours total). We reviewed that we recommend delivery via CD due to early gestational age and inability to perform a breech extraction of second twin. Will obtain NICU consultation due to anticipated prematurity. All questions answered.     Lizzeth Escalera MD  PGY 7  Maternal Fetal Medicine  [facility]             [1] (Not in a hospital admission)

## 2025-04-07 LAB
RUBV IGG SER-ACNC: 16.6 IU/ML
RUBV IGG SER-IMP: REACTIVE

## 2025-04-07 PROCEDURE — 99232 SBSQ HOSP IP/OBS MODERATE 35: CPT | Mod: 25,TH,, | Performed by: OBSTETRICS & GYNECOLOGY

## 2025-04-07 PROCEDURE — 59025 FETAL NON-STRESS TEST: CPT | Mod: 26,,, | Performed by: OBSTETRICS & GYNECOLOGY

## 2025-04-07 PROCEDURE — 25000003 PHARM REV CODE 250

## 2025-04-07 PROCEDURE — 76811 OB US DETAILED SNGL FETUS: CPT | Mod: 26,,, | Performed by: OBSTETRICS & GYNECOLOGY

## 2025-04-07 PROCEDURE — 63600175 PHARM REV CODE 636 W HCPCS

## 2025-04-07 PROCEDURE — 76812 OB US DETAILED ADDL FETUS: CPT | Mod: 26,,, | Performed by: OBSTETRICS & GYNECOLOGY

## 2025-04-07 PROCEDURE — 99222 1ST HOSP IP/OBS MODERATE 55: CPT | Mod: ,,, | Performed by: PEDIATRICS

## 2025-04-07 PROCEDURE — 11000001 HC ACUTE MED/SURG PRIVATE ROOM

## 2025-04-07 RX ADMIN — INDOMETHACIN 25 MG: 25 CAPSULE ORAL at 05:04

## 2025-04-07 RX ADMIN — INDOMETHACIN 25 MG: 25 CAPSULE ORAL at 11:04

## 2025-04-07 RX ADMIN — PENICILLIN G POTASSIUM 3 MILLION UNITS: 20000000 INJECTION, POWDER, FOR SOLUTION INTRAMUSCULAR; INTRAVENOUS at 04:04

## 2025-04-07 RX ADMIN — PENICILLIN G POTASSIUM 3 MILLION UNITS: 20000000 INJECTION, POWDER, FOR SOLUTION INTRAMUSCULAR; INTRAVENOUS at 12:04

## 2025-04-07 RX ADMIN — PRENATAL VIT W/ FE FUMARATE-FA TAB 27-0.8 MG 1 TABLET: 27-0.8 TAB at 11:04

## 2025-04-07 RX ADMIN — PENICILLIN G POTASSIUM 3 MILLION UNITS: 20000000 INJECTION, POWDER, FOR SOLUTION INTRAMUSCULAR; INTRAVENOUS at 08:04

## 2025-04-07 RX ADMIN — FERROUS SULFATE TAB 325 MG (65 MG ELEMENTAL FE) 1 EACH: 325 (65 FE) TAB at 11:04

## 2025-04-07 RX ADMIN — SODIUM CHLORIDE, POTASSIUM CHLORIDE, SODIUM LACTATE AND CALCIUM CHLORIDE: 600; 310; 30; 20 INJECTION, SOLUTION INTRAVENOUS at 04:04

## 2025-04-07 NOTE — CONSULTS
[facility] Labor & Delivery  Neonatology   Consult and Delivery Plan Note    Patient Name: Zbigniew Kearney  MRN: [id number]  Attending Physician: Erich Cardoso III, *    Maternal History and Summary of Discussion:     I was asked by Dr. Cardona to see this patient and to review expectations and decision-making surrounding delivery of her infant(s). I have spoken directly with Dr. Cardona who relayed the purpose of the consult and a summary of the relevant clinical information. I have reviewed the patient's available records, discussed her case with her obstetric care providers, and obtained further history by interviewing her. Her partner was present in the room during this consultation    Zbigniew Kearney is a 19 y.o.  woman who is 26w0d weeks pregnant with a dichorionic diamniotic twin pregnancy, as dated by LMP. Her pregnancy is complicated by threatened  labor. Her prenatal labs are unremarkable and history includes childhood asthma and anemia.. The key issues surrounding her pregnancy prompting neonatology consultation include threatened  labor at 26 weeks gestation (currently 4 cm dilated).      Summary of Discussion:     Following introductions of everyone present and discussing the goals of our conversation today the following is a summary of our discussion. Zbigniew is expecting a boy and a girl, who are currently unnamed.    Zbigniew and I reviewed expectations for decision-making and outcomes surrounding the birth of an infant with similar characteristics, including  delivery at 26 weeks of a twin gestation. These potential outcomes included  Respiratory distress (needed respiratory support ranging from CPAP to NIPPV to intubation; possible need for surfactant), risk for infection (screen with CBC and blood culture, may need antibiotics), echocardiogram for PDA evaluation and assessing heart structure, importance of a human milk diet for feeding tolerance and  decrease the incidence of NEC, screening for intraventricular hemorrhage and ROP, temp stability (need for an isolette) and electrolyte balance . I emphasized that specific individual outcomes are hard to predict, and that the likelihood of long-term adverse outcomes can be difficult to assess in the  period.     Specific issues surrounding the circumstances of delivery and possible need for resuscitation were reviewed. Potential medical issues in the first few hours to days were reviewed including but not limited to respiratory support, feeding difficulty and need for IV access including umbilical/PICC lines.     A review of what to expect during a typical NICU stay was undertaken. Emphasis was placed on issues surrounding the care of a  who is delivered prematurely at 26 weeks and the range of available therapies was reviewed. Issues surrounding breastfeeding were reviewed. Currently the expectant mother does wish to breastfeed and she is willing to use the breastpump and amenable to donor milk. The availability of lactation counseling and support was reviewed. We discussed criteria for NICU discharge, which may include temp stability in an open crib, being event free from apnea, bradycardia and desaturation and taking all feeds by mouth or having a stable way of receiving enteral feeds (whether it is orally, by NGT or by G-tube). I emphasized that it is difficult to anticipate a specific length of stay prior to the infant's birth. NICU atmosphere, operations and the role of various care providers were discussed. We reviewed the process of giving and receiving information about babies in the NICU. I reviewed the NICU visitation policy    Recommendations/Plan:     My impression is that Zbigniew's response to this consultation was appropriate. The expectant parents had the opportunity to ask questions. They were told that if further questions were to come up they may request a follow-up  consultation.    In Summary:  is expecting to deliver di/di twin infants, possibly at 26 weeks (depending on if labor progresses). Delivery is recommended the OR with L&D Team and NICU Team present.     I have reviewed my recommendations with Dr. Cardona. Thank you for allowing me to participate in the care of this patient. Please do not hesitate to contact the Neonatologist or NICU Palliative Care Nurse if we can be of further assistance.      Anahi Carlton MD FAAP  Neonatology  [facility]  04/07/2025  7:30 PM      Total time spent in consultation (chart review, discussion with medical team, discussion with patient and documentation) 55 minutes.

## 2025-04-07 NOTE — NURSING
1320 patient brought down to [facility] for a follow-up growth ultrasound.  1430 patient brought back to LDR 10. Growth ultrasound done.

## 2025-04-07 NOTE — PLAN OF CARE
Problem:  Fall Injury Risk  Goal: Absence of Fall, Infant Drop and Related Injury  Outcome: Progressing     Problem: Adult Inpatient Plan of Care  Goal: Plan of Care Review  Outcome: Progressing  Goal: Patient-Specific Goal (Individualized)  Outcome: Progressing  Goal: Absence of Hospital-Acquired Illness or Injury  Outcome: Progressing  Goal: Optimal Comfort and Wellbeing  Outcome: Progressing  Goal: Readiness for Transition of Care  Outcome: Progressing     Problem: Fatigue  Goal: Improved Activity Tolerance  Outcome: Progressing     Problem: Pain Acute  Goal: Optimal Pain Control and Function  Outcome: Progressing     Problem:  Labor  Goal: Delayed  Delivery  Outcome: Progressing

## 2025-04-07 NOTE — PROGRESS NOTES
[facility], [place] - Labor & Delivery  Maternal & Fetal Medicine  Progress Note    Patient Name: Zbigniew Kearney  MRN: [id number]  Code Status: Full Code   Admission Date: 4/5/2025  Length of Stay: 2 days  Attending Physician: Erich Cardoso III, *  Primary Care Provider: Landen Mcallister MD    Subjective:     Interval History:   NAEON. Patient denies any contractions overnight. + FM. Reports scant vaginal spotting with wiping. Denies LOF.     Review of patient's allergies indicates:  No Known Allergies    Objective:     Vital Signs (Most Recent):  Temp: 98 °F (36.7 °C) (04/07/25 0530)  Pulse: 97 (04/07/25 0630)  Resp: 16 (04/07/25 0530)  BP: 116/77 (04/07/25 0630)  SpO2: 100 % (04/07/25 0629) Vital Signs (24h Range):  Temp:  [97.8 °F (36.6 °C)-98.6 °F (37 °C)] 98 °F (36.7 °C)  Pulse:  [] 97  Resp:  [14-18] 16  SpO2:  [96 %-100 %] 100 %  BP: ()/(50-78) 116/77       Intake/Output Summary (Last 24 hours) at 4/7/2025 0645  Last data filed at 4/7/2025 0644  Gross per 24 hour   Intake 3865.31 ml   Output 1600 ml   Net 2265.31 ml       Physical Exam:   Constitutional: She is oriented to person, place, and time. She appears well-developed and well-nourished.     Eyes: Conjunctivae are normal.     Cardiovascular:  Normal rate.             Pulmonary/Chest: Effort normal. No respiratory distress.        Abdominal: Soft. There is no abdominal tenderness. There is no rebound and no guarding.   gravid             Musculoskeletal: Moves all extremeties.       Neurological: She is alert and oriented to person, place, and time.     Psychiatric: She has a normal mood and affect. Her behavior is normal.     SVE @ 1000: 4/80/-3/BBOW (4/6)    FHT/NST: A: 130, B 135; mod hossein, + accels, + occ variable decels   Cat 2 (reassuring)                 TOCO: Some uterine irritability noted, no contractions       Significant Labs:  None new    Assessment/Plan:   25w6d weeks gestation presents for:    Active Diagnoses:    Diagnosis Date Noted  POA    PRINCIPAL PROBLEM:   labor in second trimester [O60.02] 2025 Yes    Dichorionic diamniotic twin pregnancy in second trimester [O30.042] 2025 Yes    Asthma - childhood [J45.909] 2019 Yes      Problems Resolved During this Admission:     Zbigniew Kearney is a 19 y.o.  at 26w0d with Di-Di twin gestation who is admitted for management of  labor.     PTL   - Admitted to L&D with SVE /-3; stable checks x4 (-)  - S/p BMZ course (-)  - Patient asymptomatic today; Can de-escalate care today if repeat exam this morning unchanged (DC mag, PCN)  - Continue indocin through steroid window for tocolysis (last dose tonight as BMZ administered at 2330)  - Currently on continuous EFM and NPO; once de-escalated, can transition to CLD and NST BID  - If concern for imminent delivery, plan for pCS due to fetal prematurity  - Neonatology consult pending     Di Di twin pregnancy  - Follows with Dr[facility] Fiore at [facility]  - Prenatal labs UTD, 3T labs in process   - BSUS with Twin A: vertex, Twin B: transverse head mat R  - Will require C/S for delivery if labor progresses      Anemia  -H/H on admit   -1u pRBC held  -Fe/Colace PPH risk: medium      Childhood Asthma  -asymptomatic  -no home meds    Liz Wilburn MD  OB/GYN PGY-3

## 2025-04-08 LAB
ABSOLUTE EOSINOPHIL (OHS): 0 K/UL
ABSOLUTE MONOCYTE (OHS): 2.75 K/UL (ref 0.3–1)
ABSOLUTE NEUTROPHIL COUNT (OHS): 12.88 K/UL (ref 1.8–7.7)
BASOPHILS # BLD AUTO: 0.02 K/UL
BASOPHILS NFR BLD AUTO: 0.1 %
ERYTHROCYTE [DISTWIDTH] IN BLOOD BY AUTOMATED COUNT: 13.3 % (ref 11.5–14.5)
GROUP B STREPTOCOCCUS, PCR (OHS): NEGATIVE
HCT VFR BLD AUTO: 22.6 % (ref 37–48.5)
HGB BLD-MCNC: 7.4 GM/DL (ref 12–16)
IMM GRANULOCYTES # BLD AUTO: 0.12 K/UL (ref 0–0.04)
IMM GRANULOCYTES NFR BLD AUTO: 0.7 % (ref 0–0.5)
LYMPHOCYTES # BLD AUTO: 1.27 K/UL (ref 1–4.8)
MCH RBC QN AUTO: 28.9 PG (ref 27–31)
MCHC RBC AUTO-ENTMCNC: 32.7 G/DL (ref 32–36)
MCV RBC AUTO: 88 FL (ref 82–98)
NUCLEATED RBC (/100WBC) (OHS): 0 /100 WBC
PLATELET # BLD AUTO: 232 K/UL (ref 150–450)
PMV BLD AUTO: 9.5 FL (ref 9.2–12.9)
RBC # BLD AUTO: 2.56 M/UL (ref 4–5.4)
RELATIVE EOSINOPHIL (OHS): 0 %
RELATIVE LYMPHOCYTE (OHS): 7.5 % (ref 18–48)
RELATIVE MONOCYTE (OHS): 16.1 % (ref 4–15)
RELATIVE NEUTROPHIL (OHS): 75.6 % (ref 38–73)
WBC # BLD AUTO: 17.04 K/UL (ref 3.9–12.7)

## 2025-04-08 PROCEDURE — 25000003 PHARM REV CODE 250

## 2025-04-08 PROCEDURE — 37000009 HC ANESTHESIA EA ADD 15 MINS: Performed by: OBSTETRICS & GYNECOLOGY

## 2025-04-08 PROCEDURE — 63600175 PHARM REV CODE 636 W HCPCS

## 2025-04-08 PROCEDURE — 71000033 HC RECOVERY, INTIAL HOUR: Performed by: OBSTETRICS & GYNECOLOGY

## 2025-04-08 PROCEDURE — 36415 COLL VENOUS BLD VENIPUNCTURE: CPT

## 2025-04-08 PROCEDURE — 36004725 HC OB OR TIME LEV III - EA ADD 15 MIN: Performed by: OBSTETRICS & GYNECOLOGY

## 2025-04-08 PROCEDURE — 88307 TISSUE EXAM BY PATHOLOGIST: CPT | Mod: TC

## 2025-04-08 PROCEDURE — 36004724 HC OB OR TIME LEV III - 1ST 15 MIN: Performed by: OBSTETRICS & GYNECOLOGY

## 2025-04-08 PROCEDURE — 88307 TISSUE EXAM BY PATHOLOGIST: CPT | Mod: 26,,, | Performed by: PATHOLOGY

## 2025-04-08 PROCEDURE — 63600175 PHARM REV CODE 636 W HCPCS: Performed by: OBSTETRICS & GYNECOLOGY

## 2025-04-08 PROCEDURE — 37000008 HC ANESTHESIA 1ST 15 MINUTES: Performed by: OBSTETRICS & GYNECOLOGY

## 2025-04-08 PROCEDURE — 11000001 HC ACUTE MED/SURG PRIVATE ROOM

## 2025-04-08 PROCEDURE — 59514 CESAREAN DELIVERY ONLY: CPT | Mod: 22,AT,, | Performed by: OBSTETRICS & GYNECOLOGY

## 2025-04-08 PROCEDURE — 71000039 HC RECOVERY, EACH ADD'L HOUR: Performed by: OBSTETRICS & GYNECOLOGY

## 2025-04-08 PROCEDURE — 85025 COMPLETE CBC W/AUTO DIFF WBC: CPT

## 2025-04-08 PROCEDURE — 63600175 PHARM REV CODE 636 W HCPCS: Mod: JZ,TB

## 2025-04-08 PROCEDURE — 25000003 PHARM REV CODE 250: Performed by: OBSTETRICS & GYNECOLOGY

## 2025-04-08 RX ORDER — OXYTOCIN 10 [USP'U]/ML
INJECTION, SOLUTION INTRAMUSCULAR; INTRAVENOUS
Status: DISCONTINUED | OUTPATIENT
Start: 2025-04-08 | End: 2025-04-08

## 2025-04-08 RX ORDER — LIDOCAINE HYDROCHLORIDE 10 MG/ML
INJECTION, SOLUTION INFILTRATION; PERINEURAL
Status: DISPENSED
Start: 2025-04-08 | End: 2025-04-08

## 2025-04-08 RX ORDER — DEXMEDETOMIDINE HYDROCHLORIDE 100 UG/ML
INJECTION, SOLUTION INTRAVENOUS
Status: DISCONTINUED | OUTPATIENT
Start: 2025-04-08 | End: 2025-04-08

## 2025-04-08 RX ORDER — LIDOCAINE HYDROCHLORIDE 10 MG/ML
30 INJECTION, SOLUTION INFILTRATION; PERINEURAL ONCE
Status: COMPLETED | OUTPATIENT
Start: 2025-04-08 | End: 2025-04-08

## 2025-04-08 RX ORDER — ONDANSETRON HYDROCHLORIDE 2 MG/ML
INJECTION, SOLUTION INTRAMUSCULAR; INTRAVENOUS
Status: DISCONTINUED | OUTPATIENT
Start: 2025-04-08 | End: 2025-04-08

## 2025-04-08 RX ORDER — KETOROLAC TROMETHAMINE 30 MG/ML
30 INJECTION, SOLUTION INTRAMUSCULAR; INTRAVENOUS EVERY 6 HOURS
Status: CANCELLED | OUTPATIENT
Start: 2025-04-08 | End: 2025-04-09

## 2025-04-08 RX ORDER — SODIUM CHLORIDE, SODIUM LACTATE, POTASSIUM CHLORIDE, CALCIUM CHLORIDE 600; 310; 30; 20 MG/100ML; MG/100ML; MG/100ML; MG/100ML
INJECTION, SOLUTION INTRAVENOUS CONTINUOUS PRN
Status: DISCONTINUED | OUTPATIENT
Start: 2025-04-08 | End: 2025-04-08

## 2025-04-08 RX ORDER — DEXAMETHASONE SODIUM PHOSPHATE 4 MG/ML
INJECTION, SOLUTION INTRA-ARTICULAR; INTRALESIONAL; INTRAMUSCULAR; INTRAVENOUS; SOFT TISSUE
Status: DISCONTINUED | OUTPATIENT
Start: 2025-04-08 | End: 2025-04-08

## 2025-04-08 RX ORDER — CEFAZOLIN SODIUM 1 G/3ML
INJECTION, POWDER, FOR SOLUTION INTRAMUSCULAR; INTRAVENOUS
Status: DISCONTINUED | OUTPATIENT
Start: 2025-04-08 | End: 2025-04-08

## 2025-04-08 RX ORDER — MORPHINE SULFATE 0.5 MG/ML
INJECTION, SOLUTION EPIDURAL; INTRATHECAL; INTRAVENOUS
Status: DISCONTINUED | OUTPATIENT
Start: 2025-04-08 | End: 2025-04-08

## 2025-04-08 RX ORDER — MAGNESIUM SULFATE HEPTAHYDRATE 40 MG/ML
INJECTION, SOLUTION INTRAVENOUS
Status: DISPENSED
Start: 2025-04-08 | End: 2025-04-08

## 2025-04-08 RX ORDER — BUPIVACAINE HYDROCHLORIDE 7.5 MG/ML
INJECTION INTRAVENOUS
Status: DISCONTINUED | OUTPATIENT
Start: 2025-04-08 | End: 2025-04-08

## 2025-04-08 RX ORDER — PHENYLEPHRINE HYDROCHLORIDE 10 MG/ML
INJECTION INTRAVENOUS
Status: DISCONTINUED | OUTPATIENT
Start: 2025-04-08 | End: 2025-04-08

## 2025-04-08 RX ORDER — KETOROLAC TROMETHAMINE 30 MG/ML
30 INJECTION, SOLUTION INTRAMUSCULAR; INTRAVENOUS EVERY 6 HOURS
Status: COMPLETED | OUTPATIENT
Start: 2025-04-08 | End: 2025-04-08

## 2025-04-08 RX ORDER — IBUPROFEN 400 MG/1
800 TABLET ORAL EVERY 8 HOURS
Status: CANCELLED | OUTPATIENT
Start: 2025-04-09

## 2025-04-08 RX ORDER — ACETAMINOPHEN 10 MG/ML
INJECTION, SOLUTION INTRAVENOUS
Status: DISCONTINUED | OUTPATIENT
Start: 2025-04-08 | End: 2025-04-08

## 2025-04-08 RX ORDER — LIDOCAINE HYDROCHLORIDE 10 MG/ML
INJECTION, SOLUTION INFILTRATION; PERINEURAL
Status: DISCONTINUED
Start: 2025-04-08 | End: 2025-04-08 | Stop reason: WASHOUT

## 2025-04-08 RX ORDER — FENTANYL CITRATE 50 UG/ML
INJECTION, SOLUTION INTRAMUSCULAR; INTRAVENOUS
Status: DISCONTINUED | OUTPATIENT
Start: 2025-04-08 | End: 2025-04-08

## 2025-04-08 RX ORDER — METHYLERGONOVINE MALEATE 0.2 MG/ML
INJECTION INTRAVENOUS
Status: DISCONTINUED | OUTPATIENT
Start: 2025-04-08 | End: 2025-04-08

## 2025-04-08 RX ORDER — DOCUSATE SODIUM 100 MG/1
200 CAPSULE, LIQUID FILLED ORAL 2 TIMES DAILY
Status: DISCONTINUED | OUTPATIENT
Start: 2025-04-08 | End: 2025-04-11 | Stop reason: HOSPADM

## 2025-04-08 RX ADMIN — SODIUM CHLORIDE, SODIUM LACTATE, POTASSIUM CHLORIDE, AND CALCIUM CHLORIDE: .6; .31; .03; .02 INJECTION, SOLUTION INTRAVENOUS at 03:04

## 2025-04-08 RX ADMIN — ACETAMINOPHEN 650 MG: 325 TABLET, FILM COATED ORAL at 10:04

## 2025-04-08 RX ADMIN — KETOROLAC TROMETHAMINE 30 MG: 30 INJECTION, SOLUTION INTRAMUSCULAR; INTRAVENOUS at 04:04

## 2025-04-08 RX ADMIN — LIDOCAINE HYDROCHLORIDE 30 ML: 10 INJECTION, SOLUTION INFILTRATION; PERINEURAL at 04:04

## 2025-04-08 RX ADMIN — OXYTOCIN 8 UNITS: 10 INJECTION, SOLUTION INTRAMUSCULAR; INTRAVENOUS at 04:04

## 2025-04-08 RX ADMIN — ACETAMINOPHEN 1000 MG: 10 INJECTION INTRAVENOUS at 03:04

## 2025-04-08 RX ADMIN — DIPHENHYDRAMINE HYDROCHLORIDE 25 MG: 25 CAPSULE ORAL at 08:04

## 2025-04-08 RX ADMIN — CEFAZOLIN 2 G: 330 INJECTION, POWDER, FOR SOLUTION INTRAMUSCULAR; INTRAVENOUS at 03:04

## 2025-04-08 RX ADMIN — KETOROLAC TROMETHAMINE 30 MG: 30 INJECTION, SOLUTION INTRAMUSCULAR; INTRAVENOUS at 10:04

## 2025-04-08 RX ADMIN — DIPHENHYDRAMINE HYDROCHLORIDE 25 MG: 25 CAPSULE ORAL at 03:04

## 2025-04-08 RX ADMIN — OXYTOCIN 8 UNITS: 10 INJECTION, SOLUTION INTRAMUSCULAR; INTRAVENOUS at 03:04

## 2025-04-08 RX ADMIN — KETOROLAC TROMETHAMINE 30 MG: 30 INJECTION, SOLUTION INTRAMUSCULAR; INTRAVENOUS at 09:04

## 2025-04-08 RX ADMIN — ACETAMINOPHEN 650 MG: 325 TABLET, FILM COATED ORAL at 09:04

## 2025-04-08 RX ADMIN — ACETAMINOPHEN 650 MG: 325 TABLET, FILM COATED ORAL at 03:04

## 2025-04-08 RX ADMIN — FERROUS SULFATE TAB 325 MG (65 MG ELEMENTAL FE) 1 EACH: 325 (65 FE) TAB at 10:04

## 2025-04-08 RX ADMIN — PHENYLEPHRINE HYDROCHLORIDE 0.5 MCG/KG/MIN: 10 INJECTION INTRAVENOUS at 03:04

## 2025-04-08 RX ADMIN — SODIUM CHLORIDE, SODIUM LACTATE, POTASSIUM CHLORIDE, AND CALCIUM CHLORIDE: .6; .31; .03; .02 INJECTION, SOLUTION INTRAVENOUS at 04:04

## 2025-04-08 RX ADMIN — BUPIVACAINE HYDROCHLORIDE IN DEXTROSE 1.6 ML: 7.5 INJECTION, SOLUTION SUBARACHNOID at 03:04

## 2025-04-08 RX ADMIN — ONDANSETRON 4 MG: 2 INJECTION INTRAMUSCULAR; INTRAVENOUS at 03:04

## 2025-04-08 RX ADMIN — PHENYLEPHRINE HYDROCHLORIDE 200 MCG: 10 INJECTION INTRAVENOUS at 03:04

## 2025-04-08 RX ADMIN — KETOROLAC TROMETHAMINE 30 MG: 30 INJECTION, SOLUTION INTRAMUSCULAR; INTRAVENOUS at 03:04

## 2025-04-08 RX ADMIN — DEXMEDETOMIDINE 8 MCG: 200 INJECTION, SOLUTION INTRAVENOUS at 04:04

## 2025-04-08 RX ADMIN — DEXAMETHASONE SODIUM PHOSPHATE 4 MG: 4 INJECTION INTRA-ARTICULAR; INTRALESIONAL; INTRAMUSCULAR; INTRAVENOUS; SOFT TISSUE at 03:04

## 2025-04-08 RX ADMIN — PRENATAL VIT W/ FE FUMARATE-FA TAB 27-0.8 MG 1 TABLET: 27-0.8 TAB at 10:04

## 2025-04-08 RX ADMIN — MORPHINE SULFATE 0.1 MG: 0.5 INJECTION, SOLUTION EPIDURAL; INTRATHECAL; INTRAVENOUS at 03:04

## 2025-04-08 RX ADMIN — FENTANYL CITRATE 10 MCG: 50 INJECTION INTRAMUSCULAR; INTRAVENOUS at 03:04

## 2025-04-08 RX ADMIN — METHYLERGONOVINE MALEATE 200 MCG: 0.2 INJECTION, SOLUTION INTRAMUSCULAR; INTRAVENOUS at 03:04

## 2025-04-08 RX ADMIN — DOCUSATE SODIUM 200 MG: 100 CAPSULE, LIQUID FILLED ORAL at 08:04

## 2025-04-08 NOTE — PLAN OF CARE
Follow pumping for NICU mother. Continue to pump 8 or more in 24. Wash pump parts and air dry each use, sterilize once every 24 hours. Pump 30 minuets for twins.

## 2025-04-08 NOTE — PLAN OF CARE
Problem:  Fall Injury Risk  Goal: Absence of Fall, Infant Drop and Related Injury  2025 by Ayanna Wharton RN  Outcome: Progressing  2025 by Ayanna Wharton RN  Outcome: Progressing     Problem: Adult Inpatient Plan of Care  Goal: Plan of Care Review  2025 by Ayanna Wharton RN  Outcome: Progressing  2025 by Ayanna Wharton RN  Outcome: Progressing  Goal: Patient-Specific Goal (Individualized)  2025 by Ayanna Wharton RN  Outcome: Progressing  2025 by Ayanna Wharton RN  Outcome: Progressing  Goal: Absence of Hospital-Acquired Illness or Injury  2025 by Ayanna Wharton RN  Outcome: Progressing  2025 by Ayanna Wharton RN  Outcome: Progressing  Goal: Optimal Comfort and Wellbeing  2025 by Ayanna Wharton RN  Outcome: Progressing  2025 by Ayanna Wharton RN  Outcome: Progressing  Goal: Readiness for Transition of Care  2025 by Ayanna Wharton RN  Outcome: Progressing  2025 by Ayanna Wharton RN  Outcome: Progressing     Problem: Fatigue  Goal: Improved Activity Tolerance  2025 by Ayanna Wharton RN  Outcome: Progressing  2025 by Ayanna Wharton RN  Outcome: Progressing     Problem: Pain Acute  Goal: Optimal Pain Control and Function  2025 by Ayanna Wharton RN  Outcome: Progressing  2025 by Ayanna Wahrton RN  Outcome: Progressing     Problem: Postpartum ( Delivery)  Goal: Successful Parent Role Transition  2025 by Ayanna Wharton RN  Outcome: Progressing  2025 by Ayanna Wharton RN  Outcome: Progressing  Goal: Hemostasis  2025 by Ayanna Wharton RN  Outcome: Progressing  2025 by Ayanna Wharton RN  Outcome: Progressing  Goal: Effective Bowel Elimination  2025 by Ayanna Wharton RN  Outcome: Progressing  2025 by Ayanna Wharton, RN  Outcome: Progressing  Goal: Fluid and Electrolyte Balance  2025 0511 by Ayanna Wharton,  RN  Outcome: Progressing  4/8/2025 0510 by Ayanna Wharton RN  Outcome: Progressing  Goal: Absence of Infection Signs and Symptoms  4/8/2025 0511 by Ayanna Wharton RN  Outcome: Progressing  4/8/2025 0510 by Ayanna Wharton RN  Outcome: Progressing  Goal: Anesthesia/Sedation Recovery  4/8/2025 0511 by Ayanna Wharton RN  Outcome: Progressing  4/8/2025 0510 by Ayanna Wharton RN  Outcome: Progressing  Goal: Optimal Pain Control and Function  4/8/2025 0511 by Ayanna Wharton RN  Outcome: Progressing  4/8/2025 0510 by Ayanna Wharton RN  Outcome: Progressing  Goal: Nausea and Vomiting Relief  4/8/2025 0511 by Ayanna Wharton RN  Outcome: Progressing  4/8/2025 0510 by Ayanna Wharton RN  Outcome: Progressing  Goal: Effective Urinary Elimination  4/8/2025 0511 by Ayanna Wharton RN  Outcome: Progressing  4/8/2025 0510 by Ayanna Wharton RN  Outcome: Progressing  Goal: Effective Oxygenation and Ventilation  4/8/2025 0511 by Ayanna Wharton RN  Outcome: Progressing  4/8/2025 0510 by Ayanna Wharton RN  Outcome: Progressing

## 2025-04-08 NOTE — CARE UPDATE
Resident to bedside in the setting of STAFF ASSIST:       Upon entering room, pt noted to have undergone spontaneous rupture of membranes, with meconium noted at bedside. Sterile vaginal exam performed at this time By Dr. Lalita Yao, 10/100/0 and  Twin A in vertex orientation. Decision made to head to the OR for classical  section in the setting of SROM at 26wga. Op note to follow.       Jasmina Camara MD (Mary)   Obstetrics and Gynecology, PGY1

## 2025-04-08 NOTE — DISCHARGE INSTRUCTIONS
Breast Feeding Support Groups:   La Leche League: for breast feeding support meetings and help for mothers by phone and in person http://www.[facility] Doctors' Hospitalsla.org    ALEX BabyCafé:  [email or web address] or      504517-MILK   Café au Lait:/ Café con Leche: amelie de lactancia  maternaeh Español  www.[employer].com/groups/CafeAuLait[place]/   ([phone], [email or web address]      Outpatient Lactation Help: ([facility])   ALEX BabyCafe    [phone]   [email or web address]     ALEX Milk Mom    Luz Horn [phone]   [email or web address]   [employer]    [phone]   [email or web address] [employer]   ALEX Nesting    [phone]; [address] [facility]    [email or web address]/   [facility]:    [address] [place] [address], [address], [facility] [phone]   www.[facility] Centerreastfeedingcenter.org      Outpatient Lactation Help: ([facility])   [facility] Lactation Care    SAMIRA Harrison  (Also serves [facility])  [email or web address]   Milk Drops DEV English    www.milkdrops.org   Email: [email or web address]   [place] Lactation    Duyen [phone]   [email or web address]      [facility] Breastfeeding Support:    Pacify Virgilio - free breastfeeding support 24/7      Internet Resources:   www.[employer]   physicianguidetobreastfeeding.org   lacted.org   www.ameda.com   www.coffective.com   www.louisianabreastfeeding.org   www.maymom.com (for pumping supplies)       Additional Resources:    [facility] Helpline:    [phone] Monday-Friday 8am-5pm CST (for questions regarding medication safety and breast feeding)    Virgilio: Geri   LactMed (medications and breast feeding) Free Virgilio   Department of Healths National Breast-Feeding Helpline:  1-219.924.9528      Examples of Phone Apps to help track Babys Feeds or Pumping:       Bri  Baby Tracker     Pumping Work    [facility]  Feed [facility] Breast Feeding  Centers/Lactation Consultants:   [facility] [phone]   [facility] [phone]   [facility]   [phone]   [facility] [phone]   [facility]    [phone]   [facility] ([place]) [phone]    [facility] ([place])                [phone]   [facility]   [phone]   [facility] ([place])   [phone]

## 2025-04-08 NOTE — ANESTHESIA PROCEDURE NOTES
Spinal    Diagnosis: IUP- efren twins  Patient location during procedure: OR  Start time: 4/8/2025 3:34 AM  Timeout: 4/8/2025 3:34 AM  End time: 4/8/2025 3:34 AM    Staffing  Authorizing Provider: Sameer Peter MD  Performing Provider: Sameer Peter MD    Staffing  Performed by: Sameer Peter MD  Authorized by: Sameer Peter MD    Preanesthetic Checklist  Completed: patient identified, IV checked, site marked, risks and benefits discussed, surgical consent, monitors and equipment checked, pre-op evaluation and timeout performed  Spinal Block  Patient position: right lateral decubitus  Prep: ChloraPrep  Patient monitoring: heart rate, continuous pulse ox and frequent blood pressure checks  Approach: midline  Location: L3-4  Injection technique: single shot  CSF Fluid: clear free-flowing CSF  Needle  Needle type: Rica   Needle gauge: 25 G  Needle length: 3.5 in  Needle localization: anatomical landmarks  Assessment  Sensory level: T4   Dermatomal levels determined by pinch or prick  Ease of block: easy  Patient's tolerance of the procedure: comfortable throughout block             Normal for race

## 2025-04-08 NOTE — LACTATION NOTE
This note was copied from a baby's chart.  Lactation Note: Met mother, father and family at bedside; Introduced self. Benefits of mother's own milk and donor human milk discussed with mother. Mother encouraged to provide human milk for her baby to promote growth and development and to reduce the risk of illness and disease. Discussed the importance of frequent pumping in first two weeks to establish a full breast milk supply. Encouraged pumping 8 or more times in 24 hours and skin to skin care when baby able. Discussed pumping every 2-3 hours with only one 5-hour break without pumping for sleep. Recommended pumping schedule discussed. NICU guide given to mother. Pumping supplies at bedside. Benefits of breast milk for baby and benefits of providing breast milk for mother discussed. Benefits of Skin to skin discussed and encouraged skin to skin. Discussed use of NICU stepan pump. Required paperwork given to parents to complete. Encouragement and support offered to mom and dad. Kellen Green, BSN, RNC, CLC, IBCLC

## 2025-04-08 NOTE — TRANSFER OF CARE
"Anesthesia Transfer of Care Note    Patient: Zbigniew Kearney    Procedure(s) Performed: * No procedures listed *    Patient location: Labor and Delivery    Anesthesia Type: spinal    Transport from OR: Transported from OR on room air with adequate spontaneous ventilation    Post pain: adequate analgesia    Post assessment: no apparent anesthetic complications and tolerated procedure well    Post vital signs: stable    Level of consciousness: awake, alert and oriented    Nausea/Vomiting: no nausea/vomiting    Complications: none    Transfer of care protocol was followed      Last vitals: Visit Vitals  BP (!) 105/58 (BP Location: Left arm, Patient Position: Lying)   Pulse 88   Temp 36.7 °C (98.1 °F) (Oral)   Resp 16   Ht 5' 4" (1.626 m)   SpO2 95%   Breastfeeding No   BMI 21.97 kg/m²     "

## 2025-04-08 NOTE — RESEARCH
Spoke to mother and family regarding [id number] (Footprints) study. Will follow up at noon regarding consenting decision.

## 2025-04-08 NOTE — PLAN OF CARE
VSS. Pain controlled with oral Tylenol and Toradol. Pumping Q3.  Fundus firm and midline with moderate lochia rubra. LTV dressing in place, clean/dry/intact.  Lowery discontinued @1550. Due to void @ 2130. Passing gas. No concerns at this time.

## 2025-04-08 NOTE — BRIEF OP NOTE
BRIEF OPERATIVE NOTE       SUMMARY      Surgery Date: 2025     SURGEON: Krystyna Somers    ASSISTANT: 1) Lalita Yao MD  2) Fide Kiser MD    PREOP DIAGNOSIS: 1) IUP @ 26w1d  2) Di/di twin pregnancy  3) PTL  4) PPROM twin A  5) anemia    POSTOP DIAGNOSIS:  same    PROCEDURES: primary classical     ANESTHESIA: spinal    FINDINGS/KEY COMPONENTS: viable vtx female, viable vtx male, normal uterus,  tubes, and ovaries    ESTIMATED BLOOD LOSS: 1245cc    COMPLICATIONS: none    PATHOLOGY:   Specimens (From admission, onward)      Placentas

## 2025-04-08 NOTE — L&D DELIVERY NOTE
[facility], [place], operated by [facility] - Labor & Delivery   Section   Operative Note    SUMMARY     Surgery Date: 2025     Procedure:   1. Classical  Section via pfannenstiel skin incision    Indications:   1.  26 week twin gestation, malpresentation of twin B    Pre-operative Diagnosis:   1. IUP at 26 week 0 day pregnancy  2. PTL  3. Anemia  4. Asthma     Post-operative Diagnosis:   1. Same  2. S/p classical  section  3. PPH    Surgeons and Role:     * Krystyna Somers MD - Primary     * Lalita Yao MD - Resident - Assisting     * Fide Kiser MD - Resident - Assisting     * Renae Camara MD - Resident - Assisting    Anesthesia: Spinal/Epidural    Findings:    1. Viable  female/male infants, with Apgar scores 7/3, and 1/1, weight 800g and 940g respectively.  2. Normal appearing uterus, ovaries, and fallopian tubes.  3. Normal placenta  4. Classical incision closed in 2 layers.  5. PPH, mostly from incision  6. Excellent hemostasis following closure of each tissue layer     Estimated Blood Loss:  1245 mL           Total IV Fluids: Per anesthesia records      UOP: Per anesthesia records                   Complications:   PPH, otherwise patient tolerated well           Disposition: PACU - hemodynamically stable.           Condition: stable    Procedure Details   Patient was in labor and delivery room where she called her nurse in emergently for feeling increased pressure. Staff assist was called. At bedside patient in bathroom where ERIC was identified. She was sat on the bed where her membranes spontaneously ruptured. She was examined and was complete zero station. She was urgently moved to the OR for delivery via c/section. The patient concurred with the proposed plan, giving informed consent. The patient was taken to Operating Room, identified as Zbigniew Kearney and the procedure verified as  Delivery. A Time Out was held and the above information confirmed.    After induction of  anesthesia, the patient was prepped and draped in the usual sterile manner while placed in a dorsal supine position with a left lateral tilt. A gleason catheter was also placed per nursing. SCDs were on and cycling. Preoperative antibiotics Ancef 2 g and azithromycin 500 mg were administered. An allis test was performed confirming adequate anesthesia. A Pfannenstiel incision was made and carried down through the subcutaneous tissue to the fascia. Fascia was extended transversely. The peritoneum was identified, found to be free of adherent bowel, and entered bluntly. Peritoneal incision was extended longitudinally. The vesico-uterine peritoneum was identified, and bladder blade was inserted. The lower uterine segment was not well developed so decision as made to proceed with classical c/section. A classical incision was made and knife and extended with bandage scissors. The amniotic sac was already ruptured and the infant was noted to be in vertex presentation. The fetal head was brought to the incision and elevated out of the pelvis. The patient delivered a single viable female infant without difficulty.  Infant weighed 800 grams with Apgar scores of 7/3 at one and five minutes respectively. After the umbilical cord was clamped and cut, cord blood was obtained for evaluation. Attention was then turned to Twin B membranes were ruptured with allis. Infant was found to be transverse. Fetal head was brought to the hysterotomy. The patient delivered a single viable male without difficulty. Infant weighted 940 g with Apgars of 1/1 at one and five minutes respectively. After the umbilical cord was clamped and cut, cord blood was obtained for evaluation. The placentas were removed intact, appeared normal, and were sent to pathology. The uterus was exteriorized. The uterine incision was closed in two layers with running locked sutures of 1 Chromic. Hemostasis was observed. The uterine outline, tubes and ovaries appeared  normal. The uterus was returned to the abdominal cavity. Incision was reinspected and good hemostasis was noted. The abdominal cavity was inspected and clots removed. The subfascial space was inspected and excellent hemostasis achieved. The fascia was then reapproximated with running sutures of 1 PDS. During fascial closure patient started experiencing pain. Incision was injected with 15 cc of 1% lidocaine. Skin was reapproximated with 4-0 monocryl.    Instrument, sponge, and needle counts were correct prior the abdominal closure and at the conclusion of the case.     Pt tolerated procedure well and was in stable condition after the procedure.    **NOTE: This patient IS NOT a candidate for trial of labor after  delivery**    Fide Zavaleta MD  Obstetrics and Gynecology, PGY-2          Caio, A Girl Zbigniew [[id number]]   Delivery Information for A Nida Kearney    Birth information:  YOB: 2027   Time of birth: 3:30 AM   Sex: female   Head Delivery Date/Time: 2025  3:30 AM   Delivery type: , Classical   Gestational Age: 26w1d       Delivery Providers    Delivering clinician:            Measurements    Weight:   Length:          Apgars    Living status: Living  Apgar Component Scores:  1 min.:  5 min.:  10 min.:  15 min.:  20 min.:    Skin color:  0  0  1      Heart rate:  2  1  2      Reflex irritability:  2  1  2      Muscle tone:  1  1  0      Respiratory effort:  2  0  2      Total:  7  3  7      Apgars assigned by: NICU         Operative Delivery    Forceps attempted?: No  Vacuum extractor attempted?: No                     Interventions/Resuscitation           Cord    Vessels: 3 vessels  Complications: None  Delayed Cord Clamping?: No  Cord Clamped Date/Time: 2025  3:30 AM  Cord Blood Disposition: Sent with Baby       Placenta    Placenta delivery date/time: 2025 03:35  Placenta removal: Manual removal  Placenta appearance: Intact  Placenta disposition:  Pathology           Labor Events:       labor:       Labor Onset Date/Time:         Dilation Complete Date/Time:         Start Pushing Date/Time:         Start Pushing Date/Time:       Rupture Date/Time:            Rupture type:          Fluid Amount:       Fluid Color:                steroids:       Antibiotics given for GBS:       Induction:       Indications for induction:        Augmentation:       Indications for augmentation:       Labor complications:       Additional complications:          Cervical ripening:                     Delivery:      Episiotomy:       Indication for Episiotomy:       Perineal Lacerations:   Repaired:      Periurethral Laceration:   Repaired:     Labial Laceration:   Repaired:     Sulcus Laceration:   Repaired:     Vaginal Laceration:   Repaired:     Cervical Laceration:   Repaired:     Repair suture:       Repair # of packets:       Last Value - EBL - Nursing (mL):       Sum - EBL - Nursing (mL): 0     Last Value - EBL - Anesthesia (mL):      Calculated QBL (mL): 1245     Running total QBL (mL): 1245     Vaginal Sweep Performed:       Surgicount Correct:       Vaginal Packing:   Quantity:       Other providers:            Details (if applicable):  Trial of Labor No    Categorization: Primary    Priority: Emergency   Indications for : Multiple Gestation;Malposition   Incision Type: classical     Additional  information:  Forceps:    Vacuum:    Breech:    Observed anomalies    Other (Comments):            JAK Kearney [[id number]]   Delivery Information for JAK Kearney    Birth information:  YOB: 2027   Time of birth: 3:32 AM   Sex: male   Head Delivery Date/Time: 2025  3:32 AM   Delivery type: , Classical   Gestational Age: 26w1d       Delivery Providers    Delivering clinician:            Measurements    Weight:   Length:          Apgars    Living status: Living  Apgar Component  Scores:  1 min.:  5 min.:  10 min.:  15 min.:  20 min.:    Skin color:  0  0  1      Heart rate:  1  1  2      Reflex irritability:  0  0  1      Muscle tone:  0  0  1      Respiratory effort:  0  0  2      Total:  1  1  7      Apgars assigned by: NICU         Operative Delivery    Forceps attempted?: No  Vacuum extractor attempted?: No                     Interventions/Resuscitation    Method: NICU Attended       Cord    Vessels: 3 vessels  Complications: None  Delayed Cord Clamping?: No  Cord Clamped Date/Time: 2025  3:32 AM  Cord Blood Disposition: Sent with Baby       Placenta    Placenta delivery date/time: 2025 03:35  Placenta removal: Manual removal  Placenta appearance: Intact  Placenta disposition: Pathology           Labor Events:       labor:       Labor Onset Date/Time:         Dilation Complete Date/Time:         Start Pushing Date/Time:         Start Pushing Date/Time:       Rupture Date/Time:            Rupture type:          Fluid Amount:       Fluid Color:                steroids:       Antibiotics given for GBS:       Induction:       Indications for induction:        Augmentation:       Indications for augmentation:       Labor complications:       Additional complications:          Cervical ripening:                     Delivery:      Episiotomy:       Indication for Episiotomy:       Perineal Lacerations:   Repaired:      Periurethral Laceration:   Repaired:     Labial Laceration:   Repaired:     Sulcus Laceration:   Repaired:     Vaginal Laceration:   Repaired:     Cervical Laceration:   Repaired:     Repair suture:       Repair # of packets:       Last Value - EBL - Nursing (mL):       Sum - EBL - Nursing (mL): 0     Last Value - EBL - Anesthesia (mL):      Calculated QBL (mL): 1245     Running total QBL (mL): 1245     Vaginal Sweep Performed:       Surgicount Correct:       Vaginal Packing:   Quantity:       Other providers:            Details (if  applicable):  Trial of Labor      Categorization: Primary    Priority: Emergency   Indications for : Multiple Gestation;Malposition   Incision Type: classical     Additional  information:  Forceps:    Vacuum:    Breech:    Observed anomalies    Other (Comments):

## 2025-04-08 NOTE — LACTATION NOTE
LilLuxehony pump, tubing, collections containers and labels brought to bedside.  Discussed proper pump setting of initiation phase.  Instructed on proper usage of pump and to adjust suction according to maximum comfort level.  Verified appropriate flange fit.  Educated on the frequency and duration of pumping in order to promote and maintain a full milk supply.  Hands on pumping technique reviewed.  Encouraged hand expression after pumping.  Instructed on cleaning of breast pump parts.  Written instructions also given.  Pt verbalized understanding and appropriate recall for proper milk handling, collection, labeling, storage and transportation.

## 2025-04-09 PROBLEM — O60.02 PRETERM LABOR IN SECOND TRIMESTER: Status: RESOLVED | Noted: 2025-04-05 | Resolved: 2025-04-09

## 2025-04-09 PROBLEM — O30.042 DICHORIONIC DIAMNIOTIC TWIN PREGNANCY IN SECOND TRIMESTER: Status: RESOLVED | Noted: 2025-04-05 | Resolved: 2025-04-09

## 2025-04-09 PROBLEM — D64.9 ACUTE ON CHRONIC ANEMIA: Status: ACTIVE | Noted: 2025-04-09

## 2025-04-09 LAB
ABO + RH BLD: NORMAL
ABSOLUTE EOSINOPHIL (OHS): 0.01 K/UL
ABSOLUTE MONOCYTE (OHS): 2.39 K/UL (ref 0.3–1)
ABSOLUTE NEUTROPHIL COUNT (OHS): 12.29 K/UL (ref 1.8–7.7)
BASOPHILS # BLD AUTO: 0.02 K/UL
BASOPHILS NFR BLD AUTO: 0.1 %
BLD PROD TYP BPU: NORMAL
BLOOD UNIT EXPIRATION DATE: NORMAL
BLOOD UNIT TYPE CODE: 5100
CROSSMATCH INTERPRETATION: NORMAL
DISPENSE STATUS: NORMAL
ERYTHROCYTE [DISTWIDTH] IN BLOOD BY AUTOMATED COUNT: 13.5 % (ref 11.5–14.5)
HCT VFR BLD AUTO: 20.9 % (ref 37–48.5)
HGB BLD-MCNC: 6.5 GM/DL (ref 12–16)
IMM GRANULOCYTES # BLD AUTO: 0.19 K/UL (ref 0–0.04)
IMM GRANULOCYTES NFR BLD AUTO: 1.1 % (ref 0–0.5)
INDIRECT COOMBS: NORMAL
LYMPHOCYTES # BLD AUTO: 2.44 K/UL (ref 1–4.8)
MCH RBC QN AUTO: 27.4 PG (ref 27–31)
MCHC RBC AUTO-ENTMCNC: 31.1 G/DL (ref 32–36)
MCV RBC AUTO: 88 FL (ref 82–98)
NUCLEATED RBC (/100WBC) (OHS): 1 /100 WBC
PLATELET # BLD AUTO: 219 K/UL (ref 150–450)
PMV BLD AUTO: 9.5 FL (ref 9.2–12.9)
RBC # BLD AUTO: 2.37 M/UL (ref 4–5.4)
RELATIVE EOSINOPHIL (OHS): 0.1 %
RELATIVE LYMPHOCYTE (OHS): 14.1 % (ref 18–48)
RELATIVE MONOCYTE (OHS): 13.8 % (ref 4–15)
RELATIVE NEUTROPHIL (OHS): 70.8 % (ref 38–73)
RH BLD: NORMAL
SPECIMEN OUTDATE: NORMAL
UNIT NUMBER: NORMAL
WBC # BLD AUTO: 17.34 K/UL (ref 3.9–12.7)

## 2025-04-09 PROCEDURE — P9016 RBC LEUKOCYTES REDUCED: HCPCS

## 2025-04-09 PROCEDURE — 25000003 PHARM REV CODE 250

## 2025-04-09 PROCEDURE — 36430 TRANSFUSION BLD/BLD COMPNT: CPT

## 2025-04-09 PROCEDURE — 25000003 PHARM REV CODE 250: Performed by: OBSTETRICS & GYNECOLOGY

## 2025-04-09 PROCEDURE — 36415 COLL VENOUS BLD VENIPUNCTURE: CPT

## 2025-04-09 PROCEDURE — 11000001 HC ACUTE MED/SURG PRIVATE ROOM

## 2025-04-09 PROCEDURE — 85025 COMPLETE CBC W/AUTO DIFF WBC: CPT | Performed by: OBSTETRICS & GYNECOLOGY

## 2025-04-09 PROCEDURE — 30233N1 TRANSFUSION OF NONAUTOLOGOUS RED BLOOD CELLS INTO PERIPHERAL VEIN, PERCUTANEOUS APPROACH: ICD-10-PCS | Performed by: OBSTETRICS & GYNECOLOGY

## 2025-04-09 PROCEDURE — 86900 BLOOD TYPING SEROLOGIC ABO: CPT

## 2025-04-09 PROCEDURE — 86920 COMPATIBILITY TEST SPIN: CPT

## 2025-04-09 PROCEDURE — 36415 COLL VENOUS BLD VENIPUNCTURE: CPT | Performed by: OBSTETRICS & GYNECOLOGY

## 2025-04-09 PROCEDURE — 99232 SBSQ HOSP IP/OBS MODERATE 35: CPT | Mod: ,,, | Performed by: OBSTETRICS & GYNECOLOGY

## 2025-04-09 RX ORDER — OXYCODONE HYDROCHLORIDE 5 MG/1
5 TABLET ORAL EVERY 4 HOURS PRN
Status: DISCONTINUED | OUTPATIENT
Start: 2025-04-09 | End: 2025-04-09

## 2025-04-09 RX ORDER — ACETAMINOPHEN 325 MG/1
650 TABLET ORAL EVERY 6 HOURS
Status: COMPLETED | OUTPATIENT
Start: 2025-04-10 | End: 2025-04-10

## 2025-04-09 RX ORDER — OXYCODONE AND ACETAMINOPHEN 10; 325 MG/1; MG/1
1 TABLET ORAL EVERY 4 HOURS PRN
Status: DISCONTINUED | OUTPATIENT
Start: 2025-04-09 | End: 2025-04-11 | Stop reason: HOSPADM

## 2025-04-09 RX ORDER — CARBOPROST TROMETHAMINE 250 UG/ML
250 INJECTION, SOLUTION INTRAMUSCULAR
Status: DISCONTINUED | OUTPATIENT
Start: 2025-04-09 | End: 2025-04-11 | Stop reason: HOSPADM

## 2025-04-09 RX ORDER — OXYTOCIN-SODIUM CHLORIDE 0.9% IV SOLN 30 UNIT/500ML 30-0.9/5 UT/ML-%
10 SOLUTION INTRAVENOUS ONCE AS NEEDED
Status: DISCONTINUED | OUTPATIENT
Start: 2025-04-09 | End: 2025-04-11 | Stop reason: HOSPADM

## 2025-04-09 RX ORDER — OXYCODONE HYDROCHLORIDE 5 MG/1
5 TABLET ORAL EVERY 4 HOURS PRN
Qty: 10 TABLET | Refills: 0 | Status: SHIPPED | OUTPATIENT
Start: 2025-04-09

## 2025-04-09 RX ORDER — LANOLIN ALCOHOL/MO/W.PET/CERES
1 CREAM (GRAM) TOPICAL DAILY
Status: DISCONTINUED | OUTPATIENT
Start: 2025-04-10 | End: 2025-04-11 | Stop reason: HOSPADM

## 2025-04-09 RX ORDER — MISOPROSTOL 200 UG/1
800 TABLET ORAL ONCE AS NEEDED
Status: DISCONTINUED | OUTPATIENT
Start: 2025-04-09 | End: 2025-04-11 | Stop reason: HOSPADM

## 2025-04-09 RX ORDER — OXYTOCIN-SODIUM CHLORIDE 0.9% IV SOLN 30 UNIT/500ML 30-0.9/5 UT/ML-%
95 SOLUTION INTRAVENOUS ONCE AS NEEDED
Status: DISCONTINUED | OUTPATIENT
Start: 2025-04-09 | End: 2025-04-11 | Stop reason: HOSPADM

## 2025-04-09 RX ORDER — OXYCODONE AND ACETAMINOPHEN 5; 325 MG/1; MG/1
1 TABLET ORAL EVERY 4 HOURS PRN
Status: DISCONTINUED | OUTPATIENT
Start: 2025-04-09 | End: 2025-04-11 | Stop reason: HOSPADM

## 2025-04-09 RX ORDER — OXYTOCIN-SODIUM CHLORIDE 0.9% IV SOLN 30 UNIT/500ML 30-0.9/5 UT/ML-%
95 SOLUTION INTRAVENOUS CONTINUOUS PRN
Status: DISCONTINUED | OUTPATIENT
Start: 2025-04-09 | End: 2025-04-11 | Stop reason: HOSPADM

## 2025-04-09 RX ORDER — ONDANSETRON HYDROCHLORIDE 2 MG/ML
4 INJECTION, SOLUTION INTRAVENOUS EVERY 6 HOURS PRN
Status: ACTIVE | OUTPATIENT
Start: 2025-04-09 | End: 2025-04-10

## 2025-04-09 RX ORDER — PRENATAL WITH FERROUS FUM AND FOLIC ACID 3080; 920; 120; 400; 22; 1.84; 3; 20; 10; 1; 12; 200; 27; 25; 2 [IU]/1; [IU]/1; MG/1; [IU]/1; MG/1; MG/1; MG/1; MG/1; MG/1; MG/1; UG/1; MG/1; MG/1; MG/1; MG/1
1 TABLET ORAL DAILY
Status: DISCONTINUED | OUTPATIENT
Start: 2025-04-10 | End: 2025-04-11 | Stop reason: HOSPADM

## 2025-04-09 RX ORDER — DIPHENHYDRAMINE HCL 25 MG
25 CAPSULE ORAL EVERY 4 HOURS PRN
Status: DISCONTINUED | OUTPATIENT
Start: 2025-04-09 | End: 2025-04-11 | Stop reason: HOSPADM

## 2025-04-09 RX ORDER — FERROUS SULFATE 325(65) MG
325 TABLET ORAL
Qty: 30 TABLET | Refills: 12 | Status: SHIPPED | OUTPATIENT
Start: 2025-04-09

## 2025-04-09 RX ORDER — ONDANSETRON 8 MG/1
8 TABLET, ORALLY DISINTEGRATING ORAL EVERY 8 HOURS PRN
Status: DISCONTINUED | OUTPATIENT
Start: 2025-04-09 | End: 2025-04-11 | Stop reason: HOSPADM

## 2025-04-09 RX ORDER — OXYTOCIN 10 [USP'U]/ML
10 INJECTION, SOLUTION INTRAMUSCULAR; INTRAVENOUS ONCE AS NEEDED
Status: DISCONTINUED | OUTPATIENT
Start: 2025-04-09 | End: 2025-04-11 | Stop reason: HOSPADM

## 2025-04-09 RX ORDER — SIMETHICONE 80 MG
1 TABLET,CHEWABLE ORAL EVERY 6 HOURS PRN
Status: DISCONTINUED | OUTPATIENT
Start: 2025-04-09 | End: 2025-04-11 | Stop reason: HOSPADM

## 2025-04-09 RX ORDER — ADHESIVE BANDAGE
30 BANDAGE TOPICAL 2 TIMES DAILY PRN
Status: DISCONTINUED | OUTPATIENT
Start: 2025-04-09 | End: 2025-04-11 | Stop reason: HOSPADM

## 2025-04-09 RX ORDER — SODIUM CHLORIDE 0.9 % (FLUSH) 0.9 %
10 SYRINGE (ML) INJECTION EVERY 6 HOURS PRN
Status: DISCONTINUED | OUTPATIENT
Start: 2025-04-09 | End: 2025-04-11 | Stop reason: HOSPADM

## 2025-04-09 RX ORDER — METHYLERGONOVINE MALEATE 0.2 MG/ML
200 INJECTION INTRAVENOUS ONCE AS NEEDED
Status: DISCONTINUED | OUTPATIENT
Start: 2025-04-09 | End: 2025-04-11 | Stop reason: HOSPADM

## 2025-04-09 RX ORDER — TRANEXAMIC ACID 10 MG/ML
1000 INJECTION, SOLUTION INTRAVENOUS EVERY 30 MIN PRN
Status: DISCONTINUED | OUTPATIENT
Start: 2025-04-09 | End: 2025-04-11 | Stop reason: HOSPADM

## 2025-04-09 RX ORDER — DEXTROMETHORPHAN HYDROBROMIDE, GUAIFENESIN 5; 100 MG/5ML; MG/5ML
650 LIQUID ORAL EVERY 6 HOURS
Qty: 60 TABLET | Refills: 0 | Status: SHIPPED | OUTPATIENT
Start: 2025-04-09

## 2025-04-09 RX ORDER — IBUPROFEN 600 MG/1
600 TABLET ORAL EVERY 6 HOURS
Qty: 60 TABLET | Refills: 0 | Status: SHIPPED | OUTPATIENT
Start: 2025-04-09

## 2025-04-09 RX ORDER — BISACODYL 10 MG/1
10 SUPPOSITORY RECTAL ONCE AS NEEDED
Status: DISCONTINUED | OUTPATIENT
Start: 2025-04-09 | End: 2025-04-11 | Stop reason: HOSPADM

## 2025-04-09 RX ORDER — HYDROCODONE BITARTRATE AND ACETAMINOPHEN 500; 5 MG/1; MG/1
TABLET ORAL
Status: DISCONTINUED | OUTPATIENT
Start: 2025-04-09 | End: 2025-04-11 | Stop reason: HOSPADM

## 2025-04-09 RX ORDER — IBUPROFEN 600 MG/1
600 TABLET ORAL EVERY 6 HOURS
Status: DISCONTINUED | OUTPATIENT
Start: 2025-04-10 | End: 2025-04-11 | Stop reason: HOSPADM

## 2025-04-09 RX ORDER — IBUPROFEN 400 MG/1
800 TABLET ORAL EVERY 8 HOURS
Status: DISCONTINUED | OUTPATIENT
Start: 2025-04-09 | End: 2025-04-09

## 2025-04-09 RX ORDER — AMOXICILLIN 250 MG
1 CAPSULE ORAL NIGHTLY PRN
Status: DISCONTINUED | OUTPATIENT
Start: 2025-04-09 | End: 2025-04-11 | Stop reason: HOSPADM

## 2025-04-09 RX ORDER — OXYCODONE HYDROCHLORIDE 10 MG/1
10 TABLET ORAL EVERY 4 HOURS PRN
Status: DISCONTINUED | OUTPATIENT
Start: 2025-04-09 | End: 2025-04-09

## 2025-04-09 RX ORDER — PROCHLORPERAZINE EDISYLATE 5 MG/ML
5 INJECTION INTRAMUSCULAR; INTRAVENOUS EVERY 6 HOURS PRN
Status: DISCONTINUED | OUTPATIENT
Start: 2025-04-09 | End: 2025-04-11 | Stop reason: HOSPADM

## 2025-04-09 RX ORDER — DOCUSATE SODIUM 100 MG/1
100 CAPSULE, LIQUID FILLED ORAL 2 TIMES DAILY
Qty: 60 CAPSULE | Refills: 0 | Status: SHIPPED | OUTPATIENT
Start: 2025-04-09

## 2025-04-09 RX ORDER — DIPHENOXYLATE HYDROCHLORIDE AND ATROPINE SULFATE 2.5; .025 MG/1; MG/1
2 TABLET ORAL EVERY 6 HOURS PRN
Status: DISCONTINUED | OUTPATIENT
Start: 2025-04-09 | End: 2025-04-11 | Stop reason: HOSPADM

## 2025-04-09 RX ORDER — DOCUSATE SODIUM 100 MG/1
200 CAPSULE, LIQUID FILLED ORAL 2 TIMES DAILY
Status: DISCONTINUED | OUTPATIENT
Start: 2025-04-09 | End: 2025-04-09

## 2025-04-09 RX ADMIN — ACETAMINOPHEN 650 MG: 325 TABLET, FILM COATED ORAL at 03:04

## 2025-04-09 RX ADMIN — FERROUS SULFATE TAB 325 MG (65 MG ELEMENTAL FE) 1 EACH: 325 (65 FE) TAB at 07:04

## 2025-04-09 RX ADMIN — IBUPROFEN 800 MG: 400 TABLET ORAL at 08:04

## 2025-04-09 RX ADMIN — PRENATAL VIT W/ FE FUMARATE-FA TAB 27-0.8 MG 1 TABLET: 27-0.8 TAB at 07:04

## 2025-04-09 RX ADMIN — ACETAMINOPHEN 650 MG: 325 TABLET, FILM COATED ORAL at 10:04

## 2025-04-09 RX ADMIN — ACETAMINOPHEN 650 MG: 325 TABLET, FILM COATED ORAL at 04:04

## 2025-04-09 RX ADMIN — DOCUSATE SODIUM 200 MG: 100 CAPSULE, LIQUID FILLED ORAL at 08:04

## 2025-04-09 RX ADMIN — IBUPROFEN 800 MG: 400 TABLET ORAL at 12:04

## 2025-04-09 RX ADMIN — SIMETHICONE 80 MG: 80 TABLET, CHEWABLE ORAL at 12:04

## 2025-04-09 RX ADMIN — IBUPROFEN 800 MG: 400 TABLET ORAL at 03:04

## 2025-04-09 RX ADMIN — DOCUSATE SODIUM 200 MG: 100 CAPSULE, LIQUID FILLED ORAL at 07:04

## 2025-04-09 NOTE — ANESTHESIA POSTPROCEDURE EVALUATION
Anesthesia Post Evaluation    Patient: Zbigniew Kearney    Procedure(s) Performed: Procedure(s) (LRB):   SECTION (N/A)    Final Anesthesia Type: spinal      Patient location during evaluation: labor & delivery  Patient participation: Yes- Able to Participate  Level of consciousness: awake and alert and oriented  Post-procedure vital signs: reviewed and stable  Pain management: adequate  Airway patency: patent  ROHAN mitigation strategies: Multimodal analgesia  PONV status at discharge: No PONV  Anesthetic complications: no      Cardiovascular status: blood pressure returned to baseline, hemodynamically stable and stable  Respiratory status: unassisted, spontaneous ventilation and room air  Hydration status: euvolemic  Follow-up not needed.              Vitals Value Taken Time   BP 98/54 25 03:53   Temp 37.2 °C (98.9 °F) 25 03:53   Pulse 89 25 03:53   Resp 18 25 03:53   SpO2 94 % 25 03:53         No case tracking events are documented in the log.      Pain/Jim Score: Pain Rating Prior to Med Admin: 2 (2025  3:57 AM)

## 2025-04-09 NOTE — PROGRESS NOTES
POSTPARTUM PROGRESS NOTE    Subjective:     PPD/POD#: 1   Procedure: Primary classical C/S ( labor, di-di twins)   EGA: 26w1d   N/V: No   F/C: No   Abd Pain: Mild, well-controlled with oral pain medication   Lochia: Mild   Voiding: Yes   Ambulating: Yes   Bowel fnc: No   Contraception: Ask     Patient rates pain 4/10. She denies headache, shortness of breath, chest pain, palpitations or dizziness/lightheadedness.   Objective:      Temp:  [97.5 °F (36.4 °C)-98.9 °F (37.2 °C)] 98.9 °F (37.2 °C)  Pulse:  [69-89] 89  Resp:  [13-18] 18  SpO2:  [94 %-99 %] 94 %  BP: ()/(50-65) 98/54    Abdomen: Soft, appropriately tender   Uterus: Firm, no fundal tenderness   Incision: Bandage in place with minimal shadowing     Lab Review    Recent Labs   Lab 25  2348   *   K 3.3*      CO2 19*   BUN 5*   CREATININE 0.6   BILITOT 0.5   ALKPHOS 55   ALT 9*   AST 12       Recent Labs   Lab 25  2348 25  0821 25  0442   WBC 14.98* 17.04* 17.34*   HGB 9.1* 7.4* 6.5*   HCT 27.4* 22.6* 20.9*   MCV 86 88 88    232 219         I/O    Intake/Output Summary (Last 24 hours) at 2025 0639  Last data filed at 2025 0400  Gross per 24 hour   Intake --   Output 1710 ml   Net -1710 ml        Assessment and Plan:   Postpartum care:  - Patient doing well.  - Continue routine management and advances  - No bowel function yet, encouraged hydration/ambulation    Anemia - PPH/Acute on chronic  - H/H as above  - QBL: 1285mL  - asymptomatic  - counseled patient on option to receive blood, patient notes she feels entirely asymptomatic and declines at this time  - iron/colace     Asthma  - Asymptomatic    Judi Nix MD  OBGYN   PGY-1

## 2025-04-09 NOTE — PLAN OF CARE
Copied from baby's chart    SOCIAL WORK DISCHARGE PLANNING ASSESSMENT     SW completed discharge planning assessment with pt's parents in mother's room 646.  Pt's parents were easily engaged. Education on the role of  was provided. Emotional support provided throughout assessment.        Legal Name: Sam Yousif         :  2027  Address: [address] [place] [address], [address] [id number]  Parent's Phone Numbers: Yarsanism [phone]    Eloy [phone]     Pediatrician: None Selected. [facility].    Education: Information given on NICU Education Classes; Physician/NNP daily rounds; and Postpartum Depression signs.   Potential Eligibility for SSI Benefits: Yes. Sw to provide diagnosis letter for application process.      [Problem List]  Patient Active Problem List      Diagnosis    Healthcare maintenance    Alteration in nutrition in infant    Need for observation and evaluation of  for sepsis    Extreme prematurity, 750-999 grams, 25-26 completed weeks of gestation    Respiratory distress in     History of vascular access device    Apnea of prematurity           [facility]irth [facility]           ANTHONY: 25     Birth Weight:   0.94 kg (2 lb 1.2 oz)              Birth Length: 35 cm                      Gestational Age: 26w1d           Apgars    Living status: Living  Apgar Component Scores:  1 min.:  5 min.:  10 min.:  15 min.:  20 min.:    Skin color:  0  0  1        Heart rate:  1  1  2        Reflex irritability:  0  0  1        Muscle tone:  0  0  1        Respiratory effort:  0  0  2        Total:  1  1  7        Apgars assigned by: NICU

## 2025-04-09 NOTE — LACTATION NOTE
"   04/09/25 1100   Maternal Infant Feeding   Maternal Emotional State independent   Equipment Type   Breast Pump Type double electric, hospital grade   Breast Pump Flange Type hard   Breast Pump Flange Size 24 mm   Breast Pumping   Breast Pumping Interventions frequent pumping encouraged   Breast Pumping double electric breast pump utilized     Truisthony pump @ bedside. Patient states she is pumping "every 3 hours" and she expressed 20-25 ml at last pumping session. FOB sanitized pump pieces in quick bag this a.m. Reinforced education for frequency of pumping, cleaning of pump kit pieces, use of quick clean bag, storage, and labeling of EBM. Rx for personal use pump given. LC number on the board to call as needed.   "

## 2025-04-09 NOTE — PLAN OF CARE
VSS. Pain controlled with oral Tylenol and Ibuprofen. Pumping Q3.  Fundus firm and midline with light lochia rubra. LTV dressing in place, clean/dry/intact. Voiding spontaneously with adequate output. Passing gas. Repeat H&H 6.5/20.9. Patient receiving one unit of PRBC's, blood consents in patient's chart. CBC scheduled for AM draw. No concerns at this time.

## 2025-04-09 NOTE — CARE UPDATE
Resident to bedside to discuss downtrending H/H.     Patient is POD #1 s/p pCCS for di-di twins. EBL 1285mL.    Recent Labs   Lab 04/05/25  2348 04/08/25  0821 04/09/25  0442   WBC 14.98* 17.04* 17.34*   HGB 9.1* 7.4* 6.5*   HCT 27.4* 22.6* 20.9*   MCV 86 88 88    232 219        Patient denies headache, SOB, chest pain, palpitations, lightheadedness or dizziness.     Counseled patient on recommendation to receive one unit of blood, patient agrees. Reviewed risks.     Plan:  -1 unit pRBCs ordered    Judi Nix MD  OBGYN   PGY-1

## 2025-04-10 LAB
ABSOLUTE EOSINOPHIL (OHS): 0.04 K/UL
ABSOLUTE MONOCYTE (OHS): 1.98 K/UL (ref 0.3–1)
ABSOLUTE NEUTROPHIL COUNT (OHS): 11.68 K/UL (ref 1.8–7.7)
BASOPHILS # BLD AUTO: 0.03 K/UL
BASOPHILS NFR BLD AUTO: 0.2 %
ERYTHROCYTE [DISTWIDTH] IN BLOOD BY AUTOMATED COUNT: 13.7 % (ref 11.5–14.5)
ESTROGEN SERPL-MCNC: NORMAL PG/ML
HCT VFR BLD AUTO: 24 % (ref 37–48.5)
HGB BLD-MCNC: 8 GM/DL (ref 12–16)
IMM GRANULOCYTES # BLD AUTO: 0.3 K/UL (ref 0–0.04)
IMM GRANULOCYTES NFR BLD AUTO: 1.8 % (ref 0–0.5)
INSULIN SERPL-ACNC: NORMAL U[IU]/ML
LAB AP DIAGNOSIS CATEGORY: NORMAL
LAB AP GROSS DESCRIPTION: NORMAL
LAB AP PERFORMING LOCATION(S): NORMAL
LAB AP REPORT FOOTNOTES: NORMAL
LYMPHOCYTES # BLD AUTO: 2.29 K/UL (ref 1–4.8)
MCH RBC QN AUTO: 29 PG (ref 27–31)
MCHC RBC AUTO-ENTMCNC: 33.3 G/DL (ref 32–36)
MCV RBC AUTO: 87 FL (ref 82–98)
NUCLEATED RBC (/100WBC) (OHS): 1 /100 WBC
PLATELET # BLD AUTO: 228 K/UL (ref 150–450)
PMV BLD AUTO: 9.5 FL (ref 9.2–12.9)
RBC # BLD AUTO: 2.76 M/UL (ref 4–5.4)
RELATIVE EOSINOPHIL (OHS): 0.2 %
RELATIVE LYMPHOCYTE (OHS): 14 % (ref 18–48)
RELATIVE MONOCYTE (OHS): 12.1 % (ref 4–15)
RELATIVE NEUTROPHIL (OHS): 71.7 % (ref 38–73)
WBC # BLD AUTO: 16.32 K/UL (ref 3.9–12.7)

## 2025-04-10 PROCEDURE — 25000003 PHARM REV CODE 250

## 2025-04-10 PROCEDURE — 36415 COLL VENOUS BLD VENIPUNCTURE: CPT

## 2025-04-10 PROCEDURE — 25000003 PHARM REV CODE 250: Performed by: OBSTETRICS & GYNECOLOGY

## 2025-04-10 PROCEDURE — 99232 SBSQ HOSP IP/OBS MODERATE 35: CPT | Mod: ,,, | Performed by: STUDENT IN AN ORGANIZED HEALTH CARE EDUCATION/TRAINING PROGRAM

## 2025-04-10 PROCEDURE — 85025 COMPLETE CBC W/AUTO DIFF WBC: CPT

## 2025-04-10 PROCEDURE — 11000001 HC ACUTE MED/SURG PRIVATE ROOM

## 2025-04-10 RX ADMIN — IBUPROFEN 600 MG: 600 TABLET, FILM COATED ORAL at 11:04

## 2025-04-10 RX ADMIN — ACETAMINOPHEN 650 MG: 325 TABLET, FILM COATED ORAL at 05:04

## 2025-04-10 RX ADMIN — ACETAMINOPHEN 650 MG: 325 TABLET, FILM COATED ORAL at 12:04

## 2025-04-10 RX ADMIN — DOCUSATE SODIUM 200 MG: 100 CAPSULE, LIQUID FILLED ORAL at 08:04

## 2025-04-10 RX ADMIN — FERROUS SULFATE TAB 325 MG (65 MG ELEMENTAL FE) 1 EACH: 325 (65 FE) TAB at 08:04

## 2025-04-10 RX ADMIN — IBUPROFEN 600 MG: 600 TABLET, FILM COATED ORAL at 05:04

## 2025-04-10 RX ADMIN — OXYCODONE HYDROCHLORIDE AND ACETAMINOPHEN 1 TABLET: 5; 325 TABLET ORAL at 08:04

## 2025-04-10 RX ADMIN — IBUPROFEN 600 MG: 600 TABLET, FILM COATED ORAL at 12:04

## 2025-04-10 RX ADMIN — PRENATAL VIT W/ FE FUMARATE-FA TAB 27-0.8 MG 1 TABLET: 27-0.8 TAB at 08:04

## 2025-04-10 RX ADMIN — ACETAMINOPHEN 650 MG: 325 TABLET, FILM COATED ORAL at 11:04

## 2025-04-10 NOTE — PLAN OF CARE
VSS. Pain controlled with scheduled oral pain medication. Pumping. Fundus firm and midline with light lochia rubra. LTV c/s dressing in place w/ a small amount of drainage noted and marked. Voiding spontaneously with adequate output. Passing gas. Visits twins in NICU. No concerns at this time.

## 2025-04-10 NOTE — MEDICAL/APP STUDENT
POSTPARTUM PROGRESS NOTE    Subjective:     PPD/POD#: 2   Procedure: Primary classical C/S with BTL   EGA: 26w1d   N/V: No   F/C: No   Abd Pain: Mild, well-controlled with oral pain medication   Lochia: Mild   Voiding: Yes   Ambulating: Yes   Bowel fnc: Yes   Contraception: ask     Objective:      Temp:  [98 °F (36.7 °C)-98.5 °F (36.9 °C)] 98.5 °F (36.9 °C)  Pulse:  [70-90] 90  Resp:  [16-18] 16  SpO2:  [95 %-100 %] 95 %  BP: (100-117)/(52-74) 103/52    Abdomen: Soft, appropriately tender   Uterus: Firm, no fundal tenderness   Incision: Bandage in place with minimal shadowing     Lab Review    Recent Labs   Lab 04/05/25  2348   *   K 3.3*      CO2 19*   BUN 5*   CREATININE 0.6   BILITOT 0.5   ALKPHOS 55   ALT 9*   AST 12       Recent Labs   Lab 04/05/25  2348 04/08/25  0821 04/09/25  0442   WBC 14.98* 17.04* 17.34*   HGB 9.1* 7.4* 6.5*   HCT 27.4* 22.6* 20.9*   MCV 86 88 88    232 219         I/O    Intake/Output Summary (Last 24 hours) at 4/10/2025 0615  Last data filed at 4/9/2025 1854  Gross per 24 hour   Intake 350 ml   Output 450 ml   Net -100 ml        Assessment and Plan:   Postpartum care:  - Patient doing well.  - Continue routine management and advances     Anemia - PPH/Acute on chronic  - H/H as above  - QBL: 1285mL  - asymptomatic  - counseled patient on option to receive blood, patient notes she feels entirely asymptomatic and declines at this time  - iron/colace      Asthma  - Asymptomatic    Yonny Brown, MS3   [facility]

## 2025-04-10 NOTE — PLAN OF CARE
Mother should pump 8 or more times a day. Clean all pump parts with each use. Sterilize parts once a day. Label milk with date, time and meds. Bring milk straight to NICU or give to nurse to store in [facility] fridge. Call LC with questions.

## 2025-04-11 VITALS
SYSTOLIC BLOOD PRESSURE: 107 MMHG | BODY MASS INDEX: 21.97 KG/M2 | HEART RATE: 83 BPM | HEIGHT: 64 IN | RESPIRATION RATE: 18 BRPM | DIASTOLIC BLOOD PRESSURE: 56 MMHG | TEMPERATURE: 99 F | OXYGEN SATURATION: 97 %

## 2025-04-11 PROCEDURE — 25000003 PHARM REV CODE 250

## 2025-04-11 PROCEDURE — 99238 HOSP IP/OBS DSCHRG MGMT 30/<: CPT | Mod: ,,, | Performed by: OBSTETRICS & GYNECOLOGY

## 2025-04-11 PROCEDURE — 3E0234Z INTRODUCTION OF SERUM, TOXOID AND VACCINE INTO MUSCLE, PERCUTANEOUS APPROACH: ICD-10-PCS | Performed by: OBSTETRICS & GYNECOLOGY

## 2025-04-11 PROCEDURE — 63600175 PHARM REV CODE 636 W HCPCS

## 2025-04-11 PROCEDURE — 25000003 PHARM REV CODE 250: Performed by: OBSTETRICS & GYNECOLOGY

## 2025-04-11 PROCEDURE — 90471 IMMUNIZATION ADMIN: CPT

## 2025-04-11 PROCEDURE — 90715 TDAP VACCINE 7 YRS/> IM: CPT

## 2025-04-11 RX ORDER — ACETAMINOPHEN 325 MG/1
650 TABLET ORAL EVERY 6 HOURS SCHEDULED
Status: DISCONTINUED | OUTPATIENT
Start: 2025-04-11 | End: 2025-04-11 | Stop reason: HOSPADM

## 2025-04-11 RX ADMIN — PRENATAL VIT W/ FE FUMARATE-FA TAB 27-0.8 MG 1 TABLET: 27-0.8 TAB at 08:04

## 2025-04-11 RX ADMIN — DOCUSATE SODIUM 200 MG: 100 CAPSULE, LIQUID FILLED ORAL at 08:04

## 2025-04-11 RX ADMIN — CLOSTRIDIUM TETANI TOXOID ANTIGEN (FORMALDEHYDE INACTIVATED), CORYNEBACTERIUM DIPHTHERIAE TOXOID ANTIGEN (FORMALDEHYDE INACTIVATED), BORDETELLA PERTUSSIS TOXOID ANTIGEN (GLUTARALDEHYDE INACTIVATED), BORDETELLA PERTUSSIS FILAMENTOUS HEMAGGLUTININ ANTIGEN (FORMALDEHYDE INACTIVATED), BORDETELLA PERTUSSIS PERTACTIN ANTIGEN, AND BORDETELLA PERTUSSIS FIMBRIAE 2/3 ANTIGEN 0.5 ML: 5; 2; 2.5; 5; 3; 5 INJECTION, SUSPENSION INTRAMUSCULAR at 03:04

## 2025-04-11 RX ADMIN — IBUPROFEN 600 MG: 600 TABLET, FILM COATED ORAL at 01:04

## 2025-04-11 RX ADMIN — FERROUS SULFATE TAB 325 MG (65 MG ELEMENTAL FE) 1 EACH: 325 (65 FE) TAB at 08:04

## 2025-04-11 RX ADMIN — ACETAMINOPHEN 650 MG: 325 TABLET, FILM COATED ORAL at 05:04

## 2025-04-11 RX ADMIN — IBUPROFEN 600 MG: 600 TABLET, FILM COATED ORAL at 05:04

## 2025-04-11 RX ADMIN — ACETAMINOPHEN 650 MG: 325 TABLET, FILM COATED ORAL at 01:04

## 2025-04-11 NOTE — PROGRESS NOTES
POSTPARTUM PROGRESS NOTE    Subjective:     PPD/POD#: 3   Procedure: Primary classical C/S with BTL   EGA: 26w1d   N/V: No   F/C: No   Abd Pain: Mild, well-controlled with oral pain medication   Lochia: Mild   Voiding: Yes   Ambulating: Yes   Bowel fnc: Yes   Contraception: Declines      Objective:      Temp:  [97.8 °F (36.6 °C)-99.6 °F (37.6 °C)] 97.8 °F (36.6 °C)  Pulse:  [82-94] 82  Resp:  [16-18] 16  SpO2:  [96 %-99 %] 96 %  BP: (100-112)/(57-69) 100/57    Abdomen: Soft, appropriately tender   Uterus: Firm, no fundal tenderness   Incision: Bandage in place with minimal shadowing     Lab Review    Recent Labs   Lab 04/05/25  2348   *   K 3.3*      CO2 19*   BUN 5*   CREATININE 0.6   BILITOT 0.5   ALKPHOS 55   ALT 9*   AST 12       Recent Labs   Lab 04/08/25  0821 04/09/25  0442 04/10/25  0610   WBC 17.04* 17.34* 16.32*   HGB 7.4* 6.5* 8.0*   HCT 22.6* 20.9* 24.0*   MCV 88 88 87    219 228         I/O  No intake or output data in the 24 hours ending 04/11/25 0615       Assessment and Plan:   Postpartum care:  - Patient doing well.  - Continue routine management and advances     Anemia - PPH/Acute on chronic  - H/H as above  - QBL: 1285mL  - asymptomatic  - s/p 1u pRBC   - iron/colace      Asthma  - Asymptomatic      Patricia Luz MD  Obstetrics & Gynecology, PGY-1

## 2025-04-11 NOTE — DISCHARGE SUMMARY
Delivery Discharge Summary  Obstetrics      Primary OB Clinician: Krystyna Somers MD      Admission date: 2025  Discharge date: 2025    Disposition: To home, self care    Discharge Diagnosis List:    Problem List[1]    Procedure: , due to PTL and Di-Di twin gestation    Hospital Course:  Zbigniew Kearney is a 19 y.o. now , POD #3 who was admitted on 2025 at 25w5d for PTL in the setting of Di-Di twin gestation. Patient was subsequently admitted to labor and delivery unit with signed consents. Please see H&P for full details.    This IUP was complicated by Di Di twin gestation, childhood asthma, and anemia.      At 26w1d, patient noted to have SROM. SVE 10/100/0 and decision made to head to OR for classical  section in the setting of SROM at 26wga.    Delivery via classical  was performed without complications. PPH of 1245mL noted which improved with administration of 1u pRBC in postpartum state with appropriate rise in H/H.    Please see delivery note for further details. Her postpartum course was uncomplicated. On discharge day, patient's pain is controlled with oral pain medications. Pt is tolerating ambulation without SOB or CP, and regular diet without N/V. Reports lochia is mild. Denies any HA, vision changes, F/C, LE swelling. Denies any breast pain/soreness.    Pt in stable condition and ready for discharge. She has been instructed to start and/or continue medications and follow up with her obstetrics provider as listed below.    Pertinent studies:  CBC  Recent Labs   Lab 25  0821 25  0442 04/10/25  0610   WBC 17.04* 17.34* 16.32*   HGB 7.4* 6.5* 8.0*   HCT 22.6* 20.9* 24.0*   MCV 88 88 87    219 228          Immunization History   Administered Date(s) Administered    Influenza - Quadrivalent - PF *Preferred* (6 months and older) 2019           Caio, BIANCA Girl Zbigniew [[id number]]     Delivery:    Episiotomy:     Lacerations:     Repair  suture:     Repair # of packets:     Blood loss (ml):       Birth information:  YOB: 2027   Time of birth: 3:30 AM   Sex: female   Delivery type: , Classical   Gestational Age: 26w1d     Measurements    Weight: 800 g  Weight (lbs): 1 lb 12.2 oz  Length:          Delivery Clinician: Delivery Providers    Delivering clinician: Krystyna Somers MD          Additional  information:  Forceps:    Vacuum:    Breech:    Observed anomalies      Living?:     Apgars    Living status: Living  Apgar Component Scores:  1 min.:  5 min.:  10 min.:  15 min.:  20 min.:    Skin color:  0  0  1      Heart rate:  2  1  2      Reflex irritability:  2  1  2      Muscle tone:  1  1  0      Respiratory effort:  2  0  2      Total:  7  3  7      Apgars assigned by: NICU         Placenta: Delivered:       appearance     JAK Kearney [[id number]]     Delivery:    Episiotomy:     Lacerations:     Repair suture:     Repair # of packets:     Blood loss (ml):       Birth information:  YOB: 2027   Time of birth: 3:32 AM   Sex: male   Delivery type: , Classical   Gestational Age: 26w1d     Measurements    Weight: 940 g  Weight (lbs): 2 lb 1.2 oz  Length:          Delivery Clinician: Delivery Providers    Delivering clinician: Krystyna Somers MD          Additional  information:  Forceps:    Vacuum:    Breech:    Observed anomalies      Living?:     Apgars    Living status: Living  Apgar Component Scores:  1 min.:  5 min.:  10 min.:  15 min.:  20 min.:    Skin color:  0  0  1      Heart rate:  1  1  2      Reflex irritability:  0  0  1      Muscle tone:  0  0  1      Respiratory effort:  0  0  2      Total:  1  1  7      Apgars assigned by: NICU         Placenta: Delivered:       appearance    Patient Instructions:   Current Discharge Medication List        START taking these medications    Details   acetaminophen (TYLENOL) 650 MG TbSR Take 1 tablet (650 mg total) by mouth every 6 (six) hours.  Alternate between ibuprofen and tylenol every 3 hours. For example: @0800: ibuprofen 600mg @1100: tylenol 650mg @1400: ibuprofen 600mg @1700: tylenol 650 mg @2000: ibuprofen 600mg  Qty: 60 tablet, Refills: 0      docusate sodium (COLACE) 100 MG capsule Take 1 capsule (100 mg total) by mouth 2 (two) times daily.  Qty: 60 capsule, Refills: 0      ferrous sulfate (IRON) 325 mg (65 mg iron) Tab tablet Take 1 tablet (325 mg total) by mouth daily with breakfast.  Qty: 30 tablet, Refills: 12      !! ibuprofen (ADVIL,MOTRIN) 600 MG tablet Take 1 tablet (600 mg total) by mouth every 6 (six) hours. Alternate between ibuprofen and tylenol every 3 hours. For example: @0800: ibuprofen 600mg @1100: tylenol 650mg @1400: ibuprofen 600mg @1700: tylenol 650 mg @2000: ibuprofen 600mg  Qty: 60 tablet, Refills: 0      oxyCODONE (ROXICODONE) 5 MG immediate release tablet Take 1 tablet (5 mg total) by mouth every 4 (four) hours as needed for Pain.  Qty: 10 tablet, Refills: 0    Associated Diagnoses: Delivery by classical  section       !! - Potential duplicate medications found. Please discuss with provider.        CONTINUE these medications which have NOT CHANGED    Details   !! ibuprofen (ADVIL,MOTRIN) 600 MG tablet Take 1 tablet (600 mg total) by mouth every 6 (six) hours as needed (cramping).  Qty: 28 tablet, Refills: 0      medroxyPROGESTERone (DEPO-PROVERA) 150 mg/mL Syrg Inject 1 mL (150 mg total) into the muscle once. for 1 dose  Qty: 1 mL, Refills: 3      oxyCODONE-acetaminophen (PERCOCET) 5-325 mg per tablet Take 1 tablet by mouth every 4 (four) hours as needed.  Qty: 28 tablet, Refills: 0      PRENATABS RX 29 mg iron- 1 mg Tab        !! - Potential duplicate medications found. Please discuss with provider.          Discharge Procedure Orders   BREAST PUMP FOR HOME USE     Order Specific Question Answer Comments   Type of pump: Electric    Weight: 58.1kg    Length of need (1-99 months): 99      Diet Adult Regular      Lifting restrictions   Order Comments: No lifting more than infant for six weeks.     Other restrictions (specify):     No driving until:   Order Comments: Comfortable stepping on gas and brakes     Pelvic Rest   Order Comments: Nothing in vagina, including intercourse, for at least six weeks     Notify your health care provider if you experience any of the following:  temperature >100.4     Notify your health care provider if you experience any of the following:  persistent nausea and vomiting or diarrhea     Notify your health care provider if you experience any of the following:  severe uncontrolled pain     Notify your health care provider if you experience any of the following:  redness, tenderness, or signs of infection (pain, swelling, redness, odor or green/yellow discharge around incision site)     Notify your health care provider if you experience any of the following:  severe persistent headache     Notify your health care provider if you experience any of the following:  persistent dizziness, light-headedness, or visual disturbances     Notify your health care provider if you experience any of the following:  increased confusion or weakness     Notify your health care provider if you experience any of the following:   Order Comments: Heavy vaginal bleeding, saturating more than two pads in one hour     Activity as tolerated        Follow-up Information       Krystyna Somers MD Follow up in 6 week(s).    Specialties: Obstetrics, Obstetrics and Gynecology  Why: For post-partum follow-up  Contact information:  [address] [id number] [phone]               Krystyna Somers MD Follow up in 2 week(s).    Specialties: Obstetrics, Obstetrics and Gynecology  Why: Incision check, Mood check  Contact information:  [address] [id number] [phone]                              Attending Attestation:   I have seen the patient, reviewed the Resident's  discharge summary. I have personally interviewed and examined the patient at bedside and agree with the findings.   POD3, meeting all milestones.  Patient requesting discharge home.  Babies in NICU.  Patient stable for d/c home, recommend f/u in 1-2 wks for mood check and incision check.       Kellen Quezada MD  Obstetrics and Gynecology  [facility] ([place])           [1]   Patient Active Problem List  Diagnosis    Family history of DVT    Asthma - childhood    Delivery by classical  section    PPH (postpartum hemorrhage)    Acute on chronic anemia

## 2025-04-11 NOTE — PLAN OF CARE
Problem:  Fall Injury Risk  Goal: Absence of Fall, Infant Drop and Related Injury  Outcome: Progressing     Problem: Adult Inpatient Plan of Care  Goal: Plan of Care Review  Outcome: Progressing  Goal: Patient-Specific Goal (Individualized)  Outcome: Progressing  Goal: Absence of Hospital-Acquired Illness or Injury  Outcome: Progressing  Goal: Optimal Comfort and Wellbeing  Outcome: Progressing  Goal: Readiness for Transition of Care  Outcome: Progressing     Problem: Fatigue  Goal: Improved Activity Tolerance  Outcome: Progressing     Problem: Pain Acute  Goal: Optimal Pain Control and Function  Outcome: Progressing     Problem: Postpartum ( Delivery)  Goal: Successful Parent Role Transition  Outcome: Progressing  Goal: Hemostasis  Outcome: Progressing  Goal: Effective Bowel Elimination  Outcome: Progressing  Goal: Fluid and Electrolyte Balance  Outcome: Progressing  Goal: Absence of Infection Signs and Symptoms  Outcome: Progressing  Goal: Anesthesia/Sedation Recovery  Outcome: Progressing  Goal: Optimal Pain Control and Function  Outcome: Progressing  Goal: Nausea and Vomiting Relief  Outcome: Progressing  Goal: Effective Urinary Elimination  Outcome: Progressing  Goal: Effective Oxygenation and Ventilation  Outcome: Progressing     Problem: Wound  Goal: Optimal Coping  Outcome: Progressing  Goal: Optimal Functional Ability  Outcome: Progressing  Goal: Absence of Infection Signs and Symptoms  Outcome: Progressing  Goal: Improved Oral Intake  Outcome: Progressing  Goal: Optimal Pain Control and Function  Outcome: Progressing  Goal: Skin Health and Integrity  Outcome: Progressing  Goal: Optimal Wound Healing  Outcome: Progressing     Problem: Breastfeeding  Goal: Effective Breastfeeding  Outcome: Progressing     Problem: Infection  Goal: Absence of Infection Signs and Symptoms  Outcome: Progressing

## 2025-04-11 NOTE — LACTATION NOTE
04/11/25 0950   Maternal Infant Feeding   Maternal Emotional State independent   Equipment Type   Breast Pump Type double electric, hospital grade   Breast Pump Flange Type hard   Breast Pump Flange Size 24 mm   Breast Pumping   Breast Pumping Interventions frequent pumping encouraged     Reviewed pumping for the NICU infant education. Questions answered.

## 2025-04-11 NOTE — PLAN OF CARE
Problem:  Fall Injury Risk  Goal: Absence of Fall, Infant Drop and Related Injury  Outcome: Met     Problem: Adult Inpatient Plan of Care  Goal: Plan of Care Review  Outcome: Met  Goal: Patient-Specific Goal (Individualized)  Outcome: Met  Goal: Absence of Hospital-Acquired Illness or Injury  Outcome: Met  Goal: Optimal Comfort and Wellbeing  Outcome: Met  Goal: Readiness for Transition of Care  Outcome: Met     Problem: Fatigue  Goal: Improved Activity Tolerance  Outcome: Met     Problem: Pain Acute  Goal: Optimal Pain Control and Function  Outcome: Met     Problem: Postpartum ( Delivery)  Goal: Successful Parent Role Transition  Outcome: Met  Goal: Hemostasis  Outcome: Met  Goal: Effective Bowel Elimination  Outcome: Met  Goal: Fluid and Electrolyte Balance  Outcome: Met  Goal: Absence of Infection Signs and Symptoms  Outcome: Met  Goal: Anesthesia/Sedation Recovery  Outcome: Met  Goal: Optimal Pain Control and Function  Outcome: Met  Goal: Nausea and Vomiting Relief  Outcome: Met  Goal: Effective Urinary Elimination  Outcome: Met  Goal: Effective Oxygenation and Ventilation  Outcome: Met     Problem: Wound  Goal: Optimal Coping  Outcome: Met  Goal: Optimal Functional Ability  Outcome: Met  Goal: Absence of Infection Signs and Symptoms  Outcome: Met  Goal: Improved Oral Intake  Outcome: Met  Goal: Optimal Pain Control and Function  Outcome: Met  Goal: Skin Health and Integrity  Outcome: Met  Goal: Optimal Wound Healing  Outcome: Met     Problem: Breastfeeding  Goal: Effective Breastfeeding  Outcome: Met     Problem: Infection  Goal: Absence of Infection Signs and Symptoms  Outcome: Met

## 2025-04-11 NOTE — LACTATION NOTE
Discharge lactation education provided. Questions answered. pt to get stepan symphony breastpump from [facility].  Pt has prescription and LDH paper to obtain personal use breastpump for later use.

## 2025-04-14 ENCOUNTER — PATIENT MESSAGE (OUTPATIENT)
Dept: OBSTETRICS AND GYNECOLOGY | Facility: OTHER | Age: 20
End: 2025-04-14
Payer: MEDICAID

## 2025-04-15 ENCOUNTER — LACTATION ENCOUNTER (OUTPATIENT)
Dept: INTENSIVE CARE | Facility: OTHER | Age: 20
End: 2025-04-15

## 2025-04-15 NOTE — LACTATION NOTE
This note was copied from a baby's chart.  Called mother for her 6 day f/u on day 7. Mother is still using the stepan pump and verbalizes things are going well. Mother shared she is pumping 8x's daily with 4-5 hour stretch at night and getting 8 oz total with each pump. Praise and encouragement given. Mother shared when she goes larger periods of time without pumping she feeling unconformable and full; LC encouraged mother to stay consistent with pumping every 3 hours this. Questions were encouraged and answered.   Radha Kaminski RNC-DOROTEO, CBC

## 2025-04-17 ENCOUNTER — TELEPHONE (OUTPATIENT)
Dept: OBSTETRICS AND GYNECOLOGY | Facility: CLINIC | Age: 20
End: 2025-04-17
Payer: MEDICAID

## 2025-04-17 NOTE — TELEPHONE ENCOUNTER
----- Message from Larissa sent at 4/17/2025 10:19 AM CDT -----  Contact: Patient  Type:  Patient CallWho Called:Does the patient know what this is regarding?: Requesting a call back about having a refill on Rx oxyCODONE (ROXICODONE) 5 MG immediate release tabl ; pt said that the ibuprofen isn't working ; please advise Would the patient rather a call back or a response via [facility]?call Best Call Back Number:[phone] Additional Information: [facility] [address] [place], [place] [address]CALI martinez - [address] [facility] [facility] [place] [address]Vghjx: [phone] Fax: [phone]

## 2025-04-24 ENCOUNTER — HOSPITAL ENCOUNTER (EMERGENCY)
Facility: OTHER | Age: 20
Discharge: HOME | End: 2025-04-24
Attending: STUDENT IN AN ORGANIZED HEALTH CARE EDUCATION/TRAINING PROGRAM
Payer: MEDICAID

## 2025-04-24 VITALS
HEART RATE: 52 BPM | DIASTOLIC BLOOD PRESSURE: 62 MMHG | OXYGEN SATURATION: 97 % | RESPIRATION RATE: 14 BRPM | TEMPERATURE: 98 F | SYSTOLIC BLOOD PRESSURE: 108 MMHG

## 2025-04-24 DIAGNOSIS — Z51.89 VISIT FOR WOUND CHECK: Primary | ICD-10-CM

## 2025-04-24 PROCEDURE — 99283 EMERGENCY DEPT VISIT LOW MDM: CPT | Mod: ,,, | Performed by: STUDENT IN AN ORGANIZED HEALTH CARE EDUCATION/TRAINING PROGRAM

## 2025-04-24 PROCEDURE — 99282 EMERGENCY DEPT VISIT SF MDM: CPT

## 2025-04-24 NOTE — ED PROVIDER NOTES
Encounter Date: 2025       History     Chief Complaint   Patient presents with    fluid leakage from incision     Zbigniew Kearney is a 20 y.o. [id number] POD#15 s/p pLTCS here with complaints of incisional drainage.     Patient reports yellow drainage from right side of incision since . She says the drainage has been decreasing every day. She reports mild skin irritation but no abdominal pain.   She denies fevers/chills, abdominal pain, N/V.       Review of patient's allergies indicates:  No Known Allergies  Past Medical History:   Diagnosis Date    Asthma      Past Surgical History:   Procedure Laterality Date     SECTION N/A 2025    Procedure:  SECTION;  Surgeon: Krystyna Somers MD;  Location: [facility] L[facility];  Service: OB/GYN;  Laterality: N/A;     Family History   Problem Relation Name Age of Onset    Diabetes Maternal Grandmother      Diabetes Mother      Diabetes Sister      Cancer Neg Hx       Social History[1]  Review of Systems   Constitutional:  Negative for chills and fatigue.   HENT:  Negative for congestion and sore throat.    Eyes:  Negative for visual disturbance.   Respiratory:  Negative for chest tightness and shortness of breath.    Cardiovascular:  Negative for chest pain and leg swelling.   Gastrointestinal:  Negative for abdominal pain.   Genitourinary:  Negative for dysuria, vaginal bleeding and vaginal discharge.   Skin:  Positive for wound.   Neurological:  Negative for headaches.       Physical Exam     Initial Vitals [25 1445]   BP Pulse Resp Temp SpO2   108/62 (!) 52 14 98.1 °F (36.7 °C) 97 %      MAP       --         Physical Exam    Constitutional: She appears well-developed and well-nourished. No distress.   HENT:   Head: Normocephalic and atraumatic.   Neck: Neck supple.   Normal range of motion.  Pulmonary/Chest: No respiratory distress.   Abdominal: A surgical scar is present. There is no abdominal tenderness.   Serosanguinous drainage from 1mm defect  in incision. No erythema. Induration along pfannenstiel. Scar well healing.    Musculoskeletal:      Cervical back: Normal range of motion and neck supple.     Neurological: She is alert and oriented to person, place, and time.   Skin: Skin is warm and dry.   Psychiatric: She has a normal mood and affect.         ED Course   Procedures  Labs Reviewed - No data to display       Imaging Results    None          Medications - No data to display  Medical Decision Making  Zbigniew Kearney is a 20 y.o. [id number] POD#15 s/p pLTCS here with complaints of incisional drainage.   Temp:  [98.1 °F (36.7 °C)] 98.1 °F (36.7 °C)  Pulse:  [52] 52  Resp:  [14] 14  SpO2:  [97 %] 97 %  BP: (108)/(62) 108/62    Incisional drainage:   -Serosanguinous drainage from 1mm defect in incision. No erythema. Induration along pfannenstiel.   -Cannot probe incision with q-tip.   -Scar well healing    Patient without s/s of infection. Serosanguinous drainage likely normal postoperative changes. Return to ED precautions given for increase in drainage, pus, incisional erythema, fevers/chills, or abdominal pain.   Patient stable for discharge at this time.     Patricia Luz MD  Obstetrics & Gynecology, PGY-1                Attending Attestation:   Physician Attestation Statement for Resident:  As the supervising MD   Physician Attestation Statement: I have personally seen and examined this patient.   I agree with the above history.  -:   As the supervising MD I agree with the above PE.     As the supervising MD I agree with the above treatment, course, plan, and disposition.   I was personally present during the critical portions of the procedure(s) performed by the resident and was immediately available in the ED to provide services and assistance as needed during the entire procedure.  I have reviewed and agree with the residents interpretation of the following: lab data.  I have reviewed the following: old records at this facility.                                        Clinical Impression:  Final diagnoses:  [Z51.89] Visit for wound check (Primary)          ED Disposition Condition    Discharge Good          ED Prescriptions    None       Follow-up Information    None            Patricia Luz MD  Resident  04/24/25 1453         [1]   Social History  Tobacco Use    Smoking status: Never    Smokeless tobacco: Never   Substance Use Topics    Alcohol use: Never    Drug use: Never        Frances Palencia MD  04/24/25 1187

## 2025-04-24 NOTE — DISCHARGE INSTRUCTIONS
Call clinic [phone] or L & D after hours at [phone] for headache not relieved by Tylenol, blurry vision, or temp of 100.4 or greater.  Keep next clinic appointment.

## 2025-04-30 ENCOUNTER — LACTATION ENCOUNTER (OUTPATIENT)
Dept: INTENSIVE CARE | Facility: OTHER | Age: 20
End: 2025-04-30

## 2025-04-30 NOTE — LACTATION NOTE
This note was copied from a baby's chart.  Lick and Learn session:   LC/RN/OT/RT assisted mother with positioning baby skin to skin with head near breast. Mother hand leaked drops of breast milk on baby's lips for taste. Infant licked and tasted milk. Great first session. Encouraged mom to come as often as able to hold s2s and allow Leelan to lick and learn at the breast before/during feeding times. Parents had to unexpectedly leave earlier than they planned to  other child. Encouragement/support provided. Encouraged parents to come do s2s and allow infant to lick/learn with mom as often as able (allow at least 30-60min per session and coordinate times/dates with nurses to allow for help with transferring baby to/from parents). Support provided.

## 2025-04-30 NOTE — LACTATION NOTE
This note was copied from a baby's chart.  Lactation call to Mom:  She reports pumping~7x/24hrs for 30min with symphony breast pump (loaner/stepan at home and bedside unit pump when here). She reports~8oz per pump session-praised mom. ~56oz daily total volume (mom reports fridge/freezer here and home is full). Discussed down-regulation with a goal of ~40oz daily for twins being very appropriate. Mom to transition to home Mom Cozy pump for all pumps beginning today (keeping every thing else the same for now). Will follow on Friday for further guidance. Discussed possible lick and learn session today with  Sam,if able (RN to let LC know if/when possible today). Encouragement/support provided.

## 2025-05-22 ENCOUNTER — LACTATION ENCOUNTER (OUTPATIENT)
Dept: INTENSIVE CARE | Facility: OTHER | Age: 20
End: 2025-05-22

## 2025-05-22 NOTE — LACTATION NOTE
This note was copied from a baby's chart.  Lactation call to mom:  She reports continuing to pump~7xdaily, yielding~42-43oz/24hrs with no lactation needs. Encouraged mom to hold skin to skin and practice lick and learn when she visits. Mom reports primarily using her mom cozy breast pump. Asked mom to return the [facility] stepan/loaner breast pump if she is no longer using it (so it can be loaner to another mother if she is not using it). Mom denies any lactation needs. She asked about pain in the center of her chest that is intermittent (not associated with pumping, eating or activity). We discussed that this is NOT likely related to pumping and discussed options of possible GI or heart issues. LC encouraged mom to notify and see her provider or go to the emergency room if this persists and cannot be seen by her provider to rule out any serious issues. Mom verbalized understanding. Support and encouragement provided.

## 2025-07-02 ENCOUNTER — LACTATION ENCOUNTER (OUTPATIENT)
Dept: INTENSIVE CARE | Facility: OTHER | Age: 20
End: 2025-07-02

## 2025-07-03 ENCOUNTER — LACTATION ENCOUNTER (OUTPATIENT)
Dept: INTENSIVE CARE | Facility: OTHER | Age: 20
End: 2025-07-03

## 2025-07-03 NOTE — LACTATION NOTE
This note was copied from a baby's chart.  NICU Lactation Discharge Note:    Latch assist: mom is completely independent with breast feeding.  Discussed importance of a deep latch, signs of a good latch, signs of milk transfer, and how to know if baby is getting enough.     Feeding plan for home: Under the guidance of the Pediatrician mother to continue transition to more breast feeding/(continuing to pump/bottle feed per provider order) as desires; encouraged mother to put baby to breast on demand when early hunger cues are observed 8 or more times in 24-hour period; if signs of an effective latch and active milk transfer are noted, mother to allow baby to nurse until content; mother to continue supplement of expressed breast milk (or formula) as needed until exclusive breast feeding is well established; mother to closely monitor for signs that baby is getting enough (hydration, calories) at breast AEB at least 5-6 heavy, wet diapers/day, 3-4 loose, yellow seedy stools/day, and once birth weight is regained by day 10-14, a continued weight gain of 5-7 ounces/week; mother to follow-up with the Pediatrician for weight checks and as scheduled/needed.    Completed NICU lactation discharge teaching with good understanding verbalized by mother.  Provided mother with written handouts to reinforce verbal instructions.  Encouraged mother to participate in a breast feeding support group to facilitate meeting her breast feeding goals.  Provided mother with list of lactation community resources as well   Encouraged mom to contact [facility] for certification/[employer] information and breast feeding support provided.

## 2025-07-03 NOTE — LACTATION NOTE
This note was copied from a baby's chart.  Lactation call to mom:  Mom v/u of plan to room in tonight with Sam for probable discharge home tomorrow. Mom to return [facility] stepan/loaner breast pump tonight when she comes. Lactation to see mom tomorrow for lactation discharge education. Mom reports pumping 6-7 times/24hrs, yielding~8-10oz per pump session with large freezer supply of ebm at home. Plans to continue pumping and do some direct breast feeding as able, supplement as needed/desires with formula at times. Mom denies any lactation needs. Encouragement/support provided.

## 2025-07-07 ENCOUNTER — LACTATION ENCOUNTER (OUTPATIENT)
Dept: INTENSIVE CARE | Facility: OTHER | Age: 20
End: 2025-07-07